# Patient Record
Sex: FEMALE | Race: ASIAN | NOT HISPANIC OR LATINO | URBAN - METROPOLITAN AREA
[De-identification: names, ages, dates, MRNs, and addresses within clinical notes are randomized per-mention and may not be internally consistent; named-entity substitution may affect disease eponyms.]

---

## 2019-06-28 ENCOUNTER — EMERGENCY (EMERGENCY)
Facility: HOSPITAL | Age: 59
LOS: 1 days | Discharge: HOME | End: 2019-06-28
Attending: EMERGENCY MEDICINE | Admitting: EMERGENCY MEDICINE
Payer: COMMERCIAL

## 2019-06-28 VITALS
RESPIRATION RATE: 16 BRPM | OXYGEN SATURATION: 96 % | TEMPERATURE: 98 F | WEIGHT: 150.8 LBS | HEART RATE: 98 BPM | DIASTOLIC BLOOD PRESSURE: 99 MMHG | HEIGHT: 59 IN | SYSTOLIC BLOOD PRESSURE: 155 MMHG

## 2019-06-28 DIAGNOSIS — S20.319A ABRASION OF UNSPECIFIED FRONT WALL OF THORAX, INITIAL ENCOUNTER: ICD-10-CM
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
## 2019-06-28 DIAGNOSIS — S40.812A ABRASION OF LEFT UPPER ARM, INITIAL ENCOUNTER: ICD-10-CM
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
## 2019-06-28 DIAGNOSIS — Z98.890 OTHER SPECIFIED POSTPROCEDURAL STATES: Chronic | ICD-10-CM

## 2019-06-28 PROCEDURE — 99283 EMERGENCY DEPT VISIT LOW MDM: CPT
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
## 2019-06-28 NOTE — ED PROVIDER NOTE - NSFOLLOWUPINSTRUCTIONS_ED_ALL_ED_FT
Please follow up with your primary physician in 1-2 days for re evaluation.  Please return to ER immediately should your symptoms worsen or if you have any concern prior to this recommended follow up.     :  [facility]             ABRASION - AfterCare(R) Instructions(ER/ED)     Abrasion    WHAT YOU NEED TO KNOW:    An abrasion is a scrape on your skin. It happens when your skin rubs against a rough surface. Some examples of an abrasion include rug burn, a skinned elbow, or road rash. Abrasions can be many shapes and sizes. The wound may hurt, bleed, bruise, or swell.     DISCHARGE INSTRUCTIONS:    Return to the emergency department if:     The bleeding does not stop after 10 minutes of firm pressure.      You cannot rinse one or more foreign objects out of your wound.      You have red streaks on your skin coming from your wound.    Contact your healthcare provider if:     You have a fever or chills.       Your abrasion is red, warm, swollen, or draining pus.      You have questions or concerns about your condition or care.    Care for your abrasion:     Wash your hands and dry them with a clean towel.      Press a clean cloth against your wound to stop any bleeding.      Rinse your wound with a lot of clean water. Do not use harsh soap, alcohol, or iodine solutions.      Use a clean, wet cloth to remove any objects, such as small pieces of rocks or dirt.      Rub antibiotic ointment on your wound. This may help prevent infection and help your wound heal.      Cover the wound with a non-stick bandage. Change the bandage daily, and if gets wet or dirty.     Follow up with your healthcare provider as directed: Write down your questions so you remember to ask them during your visits.        © Copyright [employer] 2019 All illustrations and images included in CareNotes are the copyrighted property of [employer]. or [employer].      back to top                      © Copyright [employer] 2019
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
## 2019-06-28 NOTE — ED PROVIDER NOTE - SKIN, MLM
+ superficial scratches to anterior chest wall and left proximal UE.  Skin is otherwise normal color for race, warm, dry and intact. No evidence of rash.

## 2019-06-28 NOTE — ED ADULT NURSE NOTE - CHPI ED NUR SYMPTOMS NEG
no blurred vision/no weakness/no chest pain/no chest wall tenderness/no change in level of consciousness/no back pain/no loss of consciousness/no seizure/no vomiting

## 2019-06-28 NOTE — ED ADULT NURSE NOTE - NSIMPLEMENTINTERV_GEN_ALL_ED
Implemented All Universal Safety Interventions:  [place] to call system. Call bell, personal items and telephone within reach. Instruct patient to call for assistance. Room bathroom lighting operational. Non-slip footwear when patient is off stretcher. Physically safe environment: no spills, clutter or unnecessary equipment. Stretcher in lowest position, wheels locked, appropriate side rails in place.
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
## 2019-12-20 ENCOUNTER — EMERGENCY (EMERGENCY)
Facility: HOSPITAL | Age: 59
LOS: 1 days | Discharge: HOME | End: 2019-12-20
Admitting: EMERGENCY MEDICINE
Payer: COMMERCIAL

## 2019-12-20 VITALS
TEMPERATURE: 98 F | HEIGHT: 59 IN | OXYGEN SATURATION: 98 % | RESPIRATION RATE: 18 BRPM | HEART RATE: 94 BPM | SYSTOLIC BLOOD PRESSURE: 130 MMHG | DIASTOLIC BLOOD PRESSURE: 84 MMHG | WEIGHT: 147.93 LBS

## 2019-12-20 DIAGNOSIS — Z98.890 OTHER SPECIFIED POSTPROCEDURAL STATES: Chronic | ICD-10-CM

## 2019-12-20 PROCEDURE — 71046 X-RAY EXAM CHEST 2 VIEWS: CPT

## 2019-12-20 PROCEDURE — 99283 EMERGENCY DEPT VISIT LOW MDM: CPT | Mod: 25

## 2019-12-20 PROCEDURE — 99284 EMERGENCY DEPT VISIT MOD MDM: CPT

## 2019-12-20 PROCEDURE — 94640 AIRWAY INHALATION TREATMENT: CPT

## 2019-12-20 PROCEDURE — 71046 X-RAY EXAM CHEST 2 VIEWS: CPT | Mod: 26

## 2019-12-20 RX ORDER — IPRATROPIUM/ALBUTEROL SULFATE 18-103MCG
3 AEROSOL WITH ADAPTER (GRAM) INHALATION ONCE
Refills: 0 | Status: COMPLETED | OUTPATIENT
Start: 2019-12-20 | End: 2019-12-20

## 2019-12-20 RX ADMIN — Medication 3 MILLILITER(S): at 14:50

## 2019-12-20 RX ADMIN — Medication 60 MILLIGRAM(S): at 14:50
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
## 2019-12-20 NOTE — ED ADULT NURSE NOTE - NSIMPLEMENTINTERV_GEN_ALL_ED
Implemented All Universal Safety Interventions:  [place] to call system. Call bell, personal items and telephone within reach. Instruct patient to call for assistance. Room bathroom lighting operational. Non-slip footwear when patient is off stretcher. Physically safe environment: no spills, clutter or unnecessary equipment. Stretcher in lowest position, wheels locked, appropriate side rails in place.

## 2019-12-20 NOTE — ED PROVIDER NOTE - CLINICAL SUMMARY MEDICAL DECISION MAKING FREE TEXT BOX
60 y/o f presents with cough x 1 week, sob and wheezing; afebrile, vss, diffuse wheezing on exam.  Pt given neb and prednisone with resolution of wheezing, no hx asthma, likely reactive airway 2/2 viral URI, cxr negative.  Will d/c, rx prednisone, continue albuterol inhaler, f/u pmd

## 2019-12-20 NOTE — ED ADULT NURSE NOTE - OBJECTIVE STATEMENT
pt received sitting in chair, A&O x 3. pt recently dx with URI, taking Z-pack. pt arrived to ED with c/c productive cough, referred to ED by PCP who told pt to go to ED d/t SOB. pt denies SOB at rest, n/v/d, fever. NAD noted, will continue to monitor.

## 2019-12-20 NOTE — ED PROVIDER NOTE - PATIENT PORTAL LINK FT
You can access the FollowMyHealth Patient Portal offered by [facility] by registering at the following website: [email or web address]. By joining [facility]’s FollowMyHealth portal, you will also be able to view your health information using other applications (apps) compatible with our system.

## 2019-12-25 DIAGNOSIS — R05 COUGH: ICD-10-CM

## 2019-12-25 DIAGNOSIS — J06.9 ACUTE UPPER RESPIRATORY INFECTION, UNSPECIFIED: ICD-10-CM

## 2020-05-08 ENCOUNTER — APPOINTMENT (OUTPATIENT)
Dept: DISASTER EMERGENCY | Facility: HOSPITAL | Age: 60
End: 2020-05-08

## 2020-05-08 ENCOUNTER — MESSAGE (OUTPATIENT)
Age: 60
End: 2020-05-08

## 2020-05-08 LAB
SARS-COV-2 IGG SERPL IA-ACNC: <0.1 INDEX
SARS-COV-2 IGG SERPL QL IA: NEGATIVE

## 2020-11-12 ENCOUNTER — NON-APPOINTMENT (OUTPATIENT)
Age: 60
End: 2020-11-12

## 2020-11-12 ENCOUNTER — APPOINTMENT (OUTPATIENT)
Dept: HEART AND VASCULAR | Facility: CLINIC | Age: 60
End: 2020-11-12
Payer: COMMERCIAL

## 2020-11-12 VITALS
SYSTOLIC BLOOD PRESSURE: 129 MMHG | WEIGHT: 149 LBS | TEMPERATURE: 98.5 F | HEIGHT: 59 IN | OXYGEN SATURATION: 96 % | BODY MASS INDEX: 30.04 KG/M2 | DIASTOLIC BLOOD PRESSURE: 84 MMHG | HEART RATE: 104 BPM

## 2020-11-12 PROCEDURE — 99204 OFFICE O/P NEW MOD 45 MIN: CPT

## 2020-11-12 PROCEDURE — 99072 ADDL SUPL MATRL&STAF TM PHE: CPT

## 2020-11-12 NOTE — ASSESSMENT
[FreeTextEntry1] : 61 yo F with PMH asthma here for first evaluation\par \par CHEST PAIN/FLORES\par -echocardiogram to r/o any WMA\par -in the setting of exertional symptoms will obtain exercise stress test to quantify symptoms and risk stratify for CAD\par \par HTN\par -well controlled today \par -echo as aboe\par --cont to monitor\par -healthy diet and exercise \par \par HLD\par -lipid panel reviewed\par -recc healthy diet and exercise \par -repeat lipid panel in 3 months

## 2020-11-12 NOTE — HISTORY OF PRESENT ILLNESS
[FreeTextEntry1] : 61 yo F with PMH asthma here for first evaluation\par The patient reports a sensation of "pins and needles" when running to take the bus. \par Started a month ago, relieved by rest. 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

## 2020-11-12 NOTE — PHYSICAL EXAM
[General Appearance - Well Developed] : well developed [Normal Appearance] : normal appearance [Well Groomed] : well groomed [General Appearance - Well Nourished] : well nourished [No Deformities] : no deformities [General Appearance - In No Acute Distress] : no acute distress [Normal Oral Mucosa] : normal oral mucosa [No Oral Pallor] : no oral pallor [No Oral Cyanosis] : no oral cyanosis [Normal Jugular Venous A Waves Present] : normal jugular venous A waves present [Normal Jugular Venous V Waves Present] : normal jugular venous V waves present [No Jugular Venous Marshall A Waves] : no jugular venous marshall A waves [Heart Rate And Rhythm] : heart rate and rhythm were normal [Heart Sounds] : normal S1 and S2 [Murmurs] : no murmurs present [Respiration, Rhythm And Depth] : normal respiratory rhythm and effort [Exaggerated Use Of Accessory Muscles For Inspiration] : no accessory muscle use [Auscultation Breath Sounds / Voice Sounds] : lungs were clear to auscultation bilaterally [Abdomen Soft] : soft [Abdomen Tenderness] : non-tender [Abdomen Mass (___ Cm)] : no abdominal mass palpated [Abnormal Walk] : normal gait [Gait - Sufficient For Exercise Testing] : the gait was sufficient for exercise testing [Nail Clubbing] : no clubbing of the fingernails [Cyanosis, Localized] : no localized cyanosis [Petechial Hemorrhages (___cm)] : no petechial hemorrhages [] : no ischemic changes

## 2020-11-20 ENCOUNTER — APPOINTMENT (OUTPATIENT)
Dept: HEART AND VASCULAR | Facility: CLINIC | Age: 60
End: 2020-11-20

## 2020-12-10 ENCOUNTER — APPOINTMENT (OUTPATIENT)
Dept: HEART AND VASCULAR | Facility: CLINIC | Age: 60
End: 2020-12-10

## 2023-07-20 ENCOUNTER — APPOINTMENT (OUTPATIENT)
Dept: CV DIAGNOSITCS | Facility: HOSPITAL | Age: 63
End: 2023-07-20

## 2023-07-20 ENCOUNTER — APPOINTMENT (OUTPATIENT)
Dept: CV DIAGNOSTICS | Facility: HOSPITAL | Age: 63
End: 2023-07-20
Payer: COMMERCIAL

## 2023-07-20 ENCOUNTER — OUTPATIENT (OUTPATIENT)
Dept: OUTPATIENT SERVICES | Facility: HOSPITAL | Age: 63
LOS: 1 days | End: 2023-07-20
Payer: COMMERCIAL

## 2023-07-20 DIAGNOSIS — Z01.810 ENCOUNTER FOR PREPROCEDURAL CARDIOVASCULAR EXAMINATION: ICD-10-CM

## 2023-07-20 DIAGNOSIS — Z98.890 OTHER SPECIFIED POSTPROCEDURAL STATES: Chronic | ICD-10-CM

## 2023-07-20 PROCEDURE — 93018 CV STRESS TEST I&R ONLY: CPT

## 2023-07-20 PROCEDURE — 93306 TTE W/DOPPLER COMPLETE: CPT

## 2023-07-20 PROCEDURE — 93016 CV STRESS TEST SUPVJ ONLY: CPT

## 2023-07-20 PROCEDURE — 93306 TTE W/DOPPLER COMPLETE: CPT | Mod: 26

## 2023-07-20 PROCEDURE — 93017 CV STRESS TEST TRACING ONLY: CPT

## 2023-07-21 DIAGNOSIS — Z01.810 ENCOUNTER FOR PREPROCEDURAL CARDIOVASCULAR EXAMINATION: ICD-10-CM

## 2023-08-07 ENCOUNTER — TRANSCRIPTION ENCOUNTER (OUTPATIENT)
Age: 63
End: 2023-08-07

## 2023-08-08 ENCOUNTER — TRANSCRIPTION ENCOUNTER (OUTPATIENT)
Age: 63
End: 2023-08-08

## 2023-08-10 ENCOUNTER — TRANSCRIPTION ENCOUNTER (OUTPATIENT)
Age: 63
End: 2023-08-10

## 2023-08-21 ENCOUNTER — TRANSCRIPTION ENCOUNTER (OUTPATIENT)
Age: 63
End: 2023-08-21

## 2023-08-28 ENCOUNTER — TRANSCRIPTION ENCOUNTER (OUTPATIENT)
Age: 63
End: 2023-08-28

## 2023-09-06 ENCOUNTER — TRANSCRIPTION ENCOUNTER (OUTPATIENT)
Age: 63
End: 2023-09-06

## 2024-01-30 ENCOUNTER — NON-APPOINTMENT (OUTPATIENT)
Age: 64
End: 2024-01-30

## 2024-01-30 ENCOUNTER — APPOINTMENT (OUTPATIENT)
Dept: HEART AND VASCULAR | Facility: CLINIC | Age: 64
End: 2024-01-30
Payer: COMMERCIAL

## 2024-01-30 VITALS
BODY MASS INDEX: 28.43 KG/M2 | HEIGHT: 59 IN | HEART RATE: 88 BPM | OXYGEN SATURATION: 98 % | SYSTOLIC BLOOD PRESSURE: 143 MMHG | WEIGHT: 141 LBS | DIASTOLIC BLOOD PRESSURE: 87 MMHG

## 2024-01-30 DIAGNOSIS — R06.09 OTHER FORMS OF DYSPNEA: ICD-10-CM

## 2024-01-30 DIAGNOSIS — E66.3 OVERWEIGHT: ICD-10-CM

## 2024-01-30 DIAGNOSIS — Z85.3 PERSONAL HISTORY OF MALIGNANT NEOPLASM OF BREAST: ICD-10-CM

## 2024-01-30 DIAGNOSIS — J45.909 UNSPECIFIED ASTHMA, UNCOMPLICATED: ICD-10-CM

## 2024-01-30 DIAGNOSIS — Z82.49 FAMILY HISTORY OF ISCHEMIC HEART DISEASE AND OTHER DISEASES OF THE CIRCULATORY SYSTEM: ICD-10-CM

## 2024-01-30 DIAGNOSIS — Z87.09 PERSONAL HISTORY OF OTHER DISEASES OF THE RESPIRATORY SYSTEM: ICD-10-CM

## 2024-01-30 PROCEDURE — 99205 OFFICE O/P NEW HI 60 MIN: CPT

## 2024-01-30 PROCEDURE — 93000 ELECTROCARDIOGRAM COMPLETE: CPT

## 2024-01-31 ENCOUNTER — TRANSCRIPTION ENCOUNTER (OUTPATIENT)
Age: 64
End: 2024-01-31

## 2024-01-31 LAB
ALBUMIN SERPL ELPH-MCNC: 4.3 G/DL
ALP BLD-CCNC: 82 U/L
ALT SERPL-CCNC: 22 U/L
ANION GAP SERPL CALC-SCNC: 12 MMOL/L
AST SERPL-CCNC: 21 U/L
BILIRUB SERPL-MCNC: 0.3 MG/DL
BUN SERPL-MCNC: 10 MG/DL
CALCIUM SERPL-MCNC: 9.2 MG/DL
CHLORIDE SERPL-SCNC: 107 MMOL/L
CHOLEST SERPL-MCNC: 193 MG/DL
CO2 SERPL-SCNC: 23 MMOL/L
CREAT SERPL-MCNC: 0.67 MG/DL
EGFR: 99 ML/MIN/1.73M2
ESTIMATED AVERAGE GLUCOSE: 128 MG/DL
GLUCOSE SERPL-MCNC: 87 MG/DL
HBA1C MFR BLD HPLC: 6.1 %
HCT VFR BLD CALC: 43.3 %
HDLC SERPL-MCNC: 60 MG/DL
HGB BLD-MCNC: 13.4 G/DL
LDLC SERPL CALC-MCNC: 103 MG/DL
MCHC RBC-ENTMCNC: 30.1 PG
MCHC RBC-ENTMCNC: 30.9 GM/DL
MCV RBC AUTO: 97.3 FL
NONHDLC SERPL-MCNC: 134 MG/DL
PLATELET # BLD AUTO: 233 K/UL
POTASSIUM SERPL-SCNC: 4.1 MMOL/L
PROT SERPL-MCNC: 6.9 G/DL
RBC # BLD: 4.45 M/UL
RBC # FLD: 12.4 %
SODIUM SERPL-SCNC: 142 MMOL/L
TRIGL SERPL-MCNC: 178 MG/DL
WBC # FLD AUTO: 5.67 K/UL

## 2024-02-08 ENCOUNTER — NON-APPOINTMENT (OUTPATIENT)
Age: 64
End: 2024-02-08

## 2024-02-08 ENCOUNTER — APPOINTMENT (OUTPATIENT)
Dept: PLASTIC SURGERY | Facility: CLINIC | Age: 64
End: 2024-02-08
Payer: COMMERCIAL

## 2024-02-08 VITALS — HEIGHT: 59 IN | BODY MASS INDEX: 28.43 KG/M2 | WEIGHT: 141 LBS

## 2024-02-08 DIAGNOSIS — Z78.9 OTHER SPECIFIED HEALTH STATUS: ICD-10-CM

## 2024-02-08 PROCEDURE — 99203 OFFICE O/P NEW LOW 30 MIN: CPT

## 2024-02-08 NOTE — PHYSICAL EXAM
[de-identified] : mild ptosis healed circumferential scarring from prior flaps L slightly larger than R

## 2024-02-08 NOTE — HISTORY OF PRESENT ILLNESS
[FreeTextEntry1] : 64F PMH chest pain on exertion, post nasal drip, gout, here currently being worked up, here with , [facility] August 2023 BL SSM and BL ABDIRAHMAN after L Br Ca. In November 2023, symmetrizing fat grafting BL breast. initially had L Br lumpectomy 2006w/ radiation now here for nipple reconstruction, came here because she is [employer] employee, and [employer] health insurance no longer covers her prior plastic surgeon.   non-smoker due for echo tomorrow w/ Dr. 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

## 2024-02-09 ENCOUNTER — APPOINTMENT (OUTPATIENT)
Dept: CT IMAGING | Facility: HOSPITAL | Age: 64
End: 2024-02-09

## 2024-02-09 ENCOUNTER — RESULT REVIEW (OUTPATIENT)
Age: 64
End: 2024-02-09

## 2024-02-09 ENCOUNTER — OUTPATIENT (OUTPATIENT)
Dept: OUTPATIENT SERVICES | Facility: HOSPITAL | Age: 64
LOS: 1 days | End: 2024-02-09
Payer: COMMERCIAL

## 2024-02-09 DIAGNOSIS — Z98.890 OTHER SPECIFIED POSTPROCEDURAL STATES: Chronic | ICD-10-CM

## 2024-02-09 PROCEDURE — 75574 CT ANGIO HRT W/3D IMAGE: CPT

## 2024-02-09 PROCEDURE — 93306 TTE W/DOPPLER COMPLETE: CPT

## 2024-02-09 PROCEDURE — 93306 TTE W/DOPPLER COMPLETE: CPT | Mod: 26

## 2024-02-09 PROCEDURE — 75574 CT ANGIO HRT W/3D IMAGE: CPT | Mod: 26

## 2024-02-15 RX ORDER — ALBUTEROL SULFATE 90 UG/1
108 (90 BASE) INHALANT RESPIRATORY (INHALATION)
Qty: 1 | Refills: 5 | Status: ACTIVE | COMMUNITY
Start: 2024-02-15 | End: 1900-01-01

## 2024-02-22 ENCOUNTER — APPOINTMENT (OUTPATIENT)
Dept: INTERNAL MEDICINE | Facility: CLINIC | Age: 64
End: 2024-02-22
Payer: COMMERCIAL

## 2024-02-22 VITALS
DIASTOLIC BLOOD PRESSURE: 92 MMHG | SYSTOLIC BLOOD PRESSURE: 147 MMHG | TEMPERATURE: 97.3 F | BODY MASS INDEX: 27.9 KG/M2 | OXYGEN SATURATION: 97 % | WEIGHT: 138.38 LBS | HEART RATE: 103 BPM | HEIGHT: 59 IN

## 2024-02-22 DIAGNOSIS — Z00.00 ENCOUNTER FOR GENERAL ADULT MEDICAL EXAMINATION W/OUT ABNORMAL FINDINGS: ICD-10-CM

## 2024-02-22 DIAGNOSIS — M10.9 GOUT, UNSPECIFIED: ICD-10-CM

## 2024-02-22 PROCEDURE — 99396 PREV VISIT EST AGE 40-64: CPT

## 2024-02-22 NOTE — ASSESSMENT
[FreeTextEntry1] : Refusing flu shot patient has shingles and also received the shingles vaccine afterwards Last colonoscopy 2021, as per patient no abnormal findings, next 1 in 2028 Pap smear last year, as per patient with normal limits

## 2024-02-22 NOTE — HISTORY OF PRESENT ILLNESS
[de-identified] : This is a 64-year-old female with past medical history of breast cancer status pleural ostectomy on tamoxifen, gout, asthma and migraines.  Comes to establish care.  Today patient is referring of shortness of breath on exertion, has cardiac workup negative.  Her shortness of breath improved with albuterol.  Also complaining of 1 week of dry cough not associated with viral symptoms.

## 2024-02-22 NOTE — REVIEW OF SYSTEMS
[Shortness Of Breath] : shortness of breath [Wheezing] : wheezing [Cough] : cough [Dyspnea on Exertion] : dyspnea on exertion [Negative] : Gastrointestinal

## 2024-02-22 NOTE — PHYSICAL EXAM
[Normal Sclera/Conjunctiva] : normal sclera/conjunctiva [Normal Outer Ear/Nose] : the outer ears and nose were normal in appearance [No JVD] : no jugular venous distention [No Respiratory Distress] : no respiratory distress  [Normal] : normal rate, regular rhythm, normal S1 and S2 and no murmur heard [de-identified] : Bilateral wheezing

## 2024-02-26 ENCOUNTER — TRANSCRIPTION ENCOUNTER (OUTPATIENT)
Age: 64
End: 2024-02-26

## 2024-02-28 ENCOUNTER — TRANSCRIPTION ENCOUNTER (OUTPATIENT)
Age: 64
End: 2024-02-28

## 2024-02-28 RX ORDER — AZITHROMYCIN 500 MG/1
500 TABLET, FILM COATED ORAL
Qty: 3 | Refills: 0 | Status: COMPLETED | COMMUNITY
Start: 2024-02-28 | End: 2024-03-02

## 2024-02-28 RX ORDER — COLCHICINE 0.6 MG/1
0.6 TABLET ORAL DAILY
Qty: 60 | Refills: 0 | Status: ACTIVE | COMMUNITY
Start: 2024-02-22 | End: 1900-01-01

## 2024-02-29 NOTE — REASON FOR VISIT
[FreeTextEntry1] : L breast Ca again dx 2023, s/p b/l mastectomy and chemotherapy (no radiation), previously on anastrozole  2006 in-situ breast Ca -> radiation therapy x2 months of L breast -> tamoxifen x5 years [FreeTextEntry3] : Dr. Herbert

## 2024-02-29 NOTE — HISTORY OF PRESENT ILLNESS
[FreeTextEntry1] : 60-year-old woman, with a past medical history of breast cancer, and asthma, presenting for establishment of cardiovascular care.    L breast Ca again dx 2023, s/p b/l mastectomy and chemotherapy (no radiation), previously on anastrozole  2006 in-situ breast Ca -> radiation therapy x2 months of L breast -> tamoxifen x5 years    61 yo F with PMH asthma here for first evaluation  The patient reports a sensation of "pins and needles" when running to take the bus.  Started a month ago, relieved by rest. 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

## 2024-03-01 PROBLEM — Z85.3 HISTORY OF MALIGNANT NEOPLASM OF BREAST: Status: RESOLVED | Noted: 2024-03-01 | Resolved: 2024-03-01

## 2024-03-01 PROBLEM — Z87.09 HISTORY OF ASTHMA: Status: RESOLVED | Noted: 2024-03-01 | Resolved: 2024-03-01

## 2024-03-01 PROBLEM — Z82.49 FAMILY HISTORY OF HYPERTENSION: Status: ACTIVE | Noted: 2024-03-01

## 2024-03-01 PROBLEM — J45.909 ASTHMA: Status: ACTIVE | Noted: 2024-02-22

## 2024-03-01 PROBLEM — E66.3 OVERWEIGHT: Status: ACTIVE | Noted: 2024-03-01

## 2024-03-05 ENCOUNTER — NON-APPOINTMENT (OUTPATIENT)
Age: 64
End: 2024-03-05

## 2024-03-05 ENCOUNTER — APPOINTMENT (OUTPATIENT)
Dept: HEART AND VASCULAR | Facility: CLINIC | Age: 64
End: 2024-03-05
Payer: COMMERCIAL

## 2024-03-05 VITALS
SYSTOLIC BLOOD PRESSURE: 130 MMHG | HEIGHT: 59 IN | BODY MASS INDEX: 27.42 KG/M2 | DIASTOLIC BLOOD PRESSURE: 90 MMHG | OXYGEN SATURATION: 99 % | HEART RATE: 81 BPM | WEIGHT: 136 LBS

## 2024-03-05 DIAGNOSIS — I25.10 ATHEROSCLEROTIC HEART DISEASE OF NATIVE CORONARY ARTERY W/OUT ANGINA PECTORIS: ICD-10-CM

## 2024-03-05 DIAGNOSIS — E78.5 HYPERLIPIDEMIA, UNSPECIFIED: ICD-10-CM

## 2024-03-05 PROCEDURE — 99215 OFFICE O/P EST HI 40 MIN: CPT

## 2024-03-05 PROCEDURE — 93000 ELECTROCARDIOGRAM COMPLETE: CPT

## 2024-03-05 NOTE — HISTORY OF PRESENT ILLNESS
[FreeTextEntry1] : 64-year-old woman, with a past medical history of breast cancer, and asthma, presenting for follow-up of chest discomfort. Patient was diagnosed with left breast cancer in situ in 2006, and underwent radiation therapy of the left breast for 2 months followed by tamoxifen for 5 years. She was again diagnosed with breast cancer in 2023 and is now status post bilateral mastectomy and chemotherapy (anastrozole). In recent months, she notes chest discomfort when running to catch the bus. This chest discomfort is described as "pins-and-needles," and is relieved with rest. She also experiences chronic dyspnea on exertion but believes this to be secondary to asthma as it is associated with wheezing. Otherwise, she presently denies palpitations, dizziness/LOC, PND, orthopnea, HAs, abdominal pain, and LE swelling. Given these symptoms, she underwent a TTE which demonstrated normal biventricular systolic function, and a CCTA with a calcium score of 0 and minimal CAD (details below).

## 2024-03-05 NOTE — PHYSICAL EXAM
[No Acute Distress] : no acute distress [Normal Conjunctiva] : normal conjunctiva [Normal Venous Pressure] : normal venous pressure [No Carotid Bruit] : no carotid bruit [Normal S1, S2] : normal S1, S2 [No Murmur] : no murmur [No Rub] : no rub [No Gallop] : no gallop [Clear Lung Fields] : clear lung fields [Good Air Entry] : good air entry [Soft] : abdomen soft [No Respiratory Distress] : no respiratory distress  [Non Tender] : non-tender [No Masses/organomegaly] : no masses/organomegaly [Normal Gait] : normal gait [Normal Bowel Sounds] : normal bowel sounds [No Cyanosis] : no cyanosis [No Edema] : no edema [No Varicosities] : no varicosities [No Clubbing] : no clubbing [No Skin Lesions] : no skin lesions [No Rash] : no rash [Moves all extremities] : moves all extremities [No Focal Deficits] : no focal deficits [Alert and Oriented] : alert and oriented [Normal Speech] : normal speech [Normal memory] : normal memory

## 2024-03-05 NOTE — ASSESSMENT
[FreeTextEntry1] : 64-year-old woman, with a past medical history of breast cancer, and asthma, presenting for establishment of cardiovascular care.  Preoperative Cardiovascular Assessment: Patient is planned to have breast reconstruction procedure on April 8th. She is able to ambulate >10 blocks and >2 flights of stairs without inhibition by CP or FLORES. TTE demonstrated normal biventricular systolic function, and a CCTA with a calcium score of 0 and minimal CAD (details above). - RCRI Score 1, Class II risk: 6.0% 30-day risk of death, MI, or cardiac arrest - patient able to achieve >4 METs w/o anginal symptoms or anginal equivalents - patient is at low risk for an intermediate risk surgery  - no cardiac contraindications to the planned procedure  CP/FLORES: In recent months, she notes chest discomfort when running to catch the bus. This chest discomfort is described as "pins-and-needles," and is relieved with rest. She also experiences chronic dyspnea on exertion but believes this to be secondary to asthma as it is associated with wheezing. Otherwise, she presently denies palpitations, dizziness/LOC, PND, orthopnea, HAs, abdominal pain, and LE swelling. Given these symptoms, she underwent a TTE which demonstrated normal biventricular systolic function, and a CCTA with a calcium score of 0 and minimal CAD (details below). Therefore, discomfort not related to CAD. - patient instructed to contact me and/or seek immediate medical attention if symptoms recur or worsen  HLD: Latest lipid profile on 1/30/24 with a , , HDL 60, and . Minimal CAD noted on CCTA.  - atorvastatin 10mg PO QHS started today  Overweight: BMI 27.47 kg/m2. - lifestyle modifications (diet and exercise) - weight loss counseling - increase aerobic exercise as tolerated to aim for approximately 30 minutes of activity 5 days a week - heart healthy diet low in sodium, sugars and saturated fats and high in fruits, vegetables and whole grains  F/u s/p surgery.

## 2024-03-05 NOTE — CARDIOLOGY SUMMARY
[de-identified] : 3/5/24: NSR at 77 bpm 1/30/24: NSR at 76 bpm 11/12/20: sinus tachycardia () [de-identified] : TTE 2/9/24: 1. Normal left and right ventricular size and systolic function.  2. No significant valvular disease.  3. No evidence of pulmonary hypertension.  4. No pericardial effusion.  5. No prior echo is available for comparison.  [de-identified] : CCTA 2/9/24: 1.  The calcium score is 0 Agatston units. 2.  Minimal proximal LAD stenosis 3.  Remaining coronary segments appear normal

## 2024-03-06 ENCOUNTER — TRANSCRIPTION ENCOUNTER (OUTPATIENT)
Age: 64
End: 2024-03-06

## 2024-03-07 ENCOUNTER — TRANSCRIPTION ENCOUNTER (OUTPATIENT)
Age: 64
End: 2024-03-07

## 2024-03-15 NOTE — ED ADULT NURSE NOTE - CHPI ED NUR QUALITY2
Patient seen in clinic today but needed to go home to check her calendar. Will call to schedule. Card provided.      Patient: Charity Freeman    Procedure: EGD    Procedure instructions if any:    Reason for Hospital: NA    Physician: Fernando Medina MD    Diagnosis: gerd    Diabetic: No    Blood Thinners: No    Pharmacy(schedulers):    Instructions sent(schedulers):      
aching

## 2024-03-21 PROBLEM — Z80.3 FAMILY HISTORY OF BREAST CANCER: Status: ACTIVE | Noted: 2024-03-21

## 2024-03-22 ENCOUNTER — LABORATORY RESULT (OUTPATIENT)
Age: 64
End: 2024-03-22

## 2024-03-22 ENCOUNTER — APPOINTMENT (OUTPATIENT)
Dept: HEMATOLOGY ONCOLOGY | Facility: CLINIC | Age: 64
End: 2024-03-22
Payer: COMMERCIAL

## 2024-03-22 VITALS
RESPIRATION RATE: 18 BRPM | HEART RATE: 99 BPM | OXYGEN SATURATION: 97 % | HEIGHT: 59 IN | TEMPERATURE: 98.3 F | DIASTOLIC BLOOD PRESSURE: 82 MMHG | WEIGHT: 136 LBS | BODY MASS INDEX: 27.42 KG/M2 | SYSTOLIC BLOOD PRESSURE: 126 MMHG

## 2024-03-22 DIAGNOSIS — Z80.3 FAMILY HISTORY OF MALIGNANT NEOPLASM OF BREAST: ICD-10-CM

## 2024-03-22 PROCEDURE — 99204 OFFICE O/P NEW MOD 45 MIN: CPT

## 2024-03-22 RX ORDER — MONTELUKAST 10 MG/1
10 TABLET, FILM COATED ORAL DAILY
Qty: 30 | Refills: 3 | Status: DISCONTINUED | COMMUNITY
Start: 2024-02-22 | End: 2024-03-22

## 2024-03-22 RX ORDER — BUDESONIDE AND FORMOTEROL FUMARATE DIHYDRATE 80; 4.5 UG/1; UG/1
80-4.5 AEROSOL RESPIRATORY (INHALATION)
Qty: 1 | Refills: 1 | Status: DISCONTINUED | COMMUNITY
Start: 2024-02-22 | End: 2024-03-22

## 2024-03-22 RX ORDER — ALBUTEROL 90 MCG
90 AEROSOL (GRAM) INHALATION
Refills: 0 | Status: DISCONTINUED | COMMUNITY
End: 2024-03-22

## 2024-03-22 NOTE — PHYSICAL EXAM
[Restricted in physically strenuous activity but ambulatory and able to carry out work of a light or sedentary nature] : Status 1- Restricted in physically strenuous activity but ambulatory and able to carry out work of a light or sedentary nature, e.g., light house work, office work [de-identified] : s/p bilateral mastectomies with ABDIRAHMAN reconstruction, no palpable lesions in either axilla, supraclavicular areas nor over chest wall [Normal] : affect appropriate

## 2024-03-22 NOTE — ASSESSMENT
[FreeTextEntry1] : 60-year-old woman with a history of breast cancer is referred by Dr. Gupta to establish care. She was under the care of medical oncologist Annika Taylor, but she is no longer in network.  She had Stage 1A T1aN0 ER+/MD+/HER2- left breast cancer s/p left lumpectomy/SLNB on 4/17/2006 (Dr. Capri Ambriz @ [facility]), adjuvant radiation therapy to the left breast for 2 months followed by tamoxifen for 5 years.  She was then diagnosed with Stage 1A T2N0 ER+/MD+/HER2- left ILC in 2023 s/p 8/4/23 bilateral mastectomy/L SLNB/ABDIRAHMAN reconstruction (Flori Chaney/Salas Pagan @ [facility]), 11/2/23 bilateral breast reconstruction revision/fat graft.  She was taking anastrozole since end of August-November 2023 (discontinued due to arthralgia/bone pain) and was switched to tamoxifen on 11/30/23.  Pt notes possible LFT abnormalities while on Tamoxifen and taking it only for 3.5yrs because of that. She also expresses concern over bone density loss while on AI and states she prefers Tamoxifen in part because of that to the AI.   Last DEXA osteopenia in 6/2023, T score -1.2.   Reviewed with patient two different options including switching to Exemestane vs staying on Tamoxifen. I reviewed that AIs are slightly more effective than Tamoxifen in terms of breast cancer outcomes but that AIs such as Exemestane are associated with a decrease in bone density whereas Tamoxifen in a postmenopausal woman is not. I also reviewed that since she is tolerating Tamoxifen well it is an option to continue it; my concern with it is h/o LFT abnormalities that necessitated her to interrupt her therapy. Patient preferred to stay on Tamoxifen rather than switch to Exemestance and I therefore recommended to come back for a more frequent follow up asking to monitor her LFTs more frequently, with which she was in agreement with.   I also recommended to check the DEXA scan at a year's time in 6/2024, to take vitamin D/Ca.   Upon patient's request checked labs today including estradiol level.   RTC in 3 months for follow up.

## 2024-03-22 NOTE — HISTORY OF PRESENT ILLNESS
[Disease: _____________________] : Disease: [unfilled] [T: ___] : T[unfilled] [N: ___] : N[unfilled] [M: ___] : M[unfilled] [AJCC Stage: ____] : AJCC Stage: [unfilled] [de-identified] : 60-year-old woman with a history of breast cancer is referred by Dr. Gupta to establish care. She was under the care of medical oncologist Annika Taylor, but she is no longer in network.  She had Stage 1A T1aN0 ER+/IL+/HER2- left breast cancer s/p left lumpectomy/SLNB on 4/17/2006 (Dr. Capri Ambriz @ [facility]), adjuvant radiation therapy to the left breast for 2 months followed by tamoxifen for 5 years.  She was then diagnosed with Stage 1A T2N0 ER+/IL+/HER2- left ILC in 2023 s/p 8/4/23 bilateral mastectomy/L SLNB/ABDIRAHMAN reconstruction (Flori Chaney/Salas Pagan @ [facility]), 11/2/23 bilateral breast reconstruction revision/fat graft.  She was taking anastrozole since end of August-November 2023 (discontinued due to arthralgia/bone pain) and was switched to tamoxifen on 11/30/23.  5/19/23 b/l MG: There are postsurgical changes in the left breast at 12:00.  There is increasing density and distortion at 12:00 measuring approximately 13 mm.  Asymmetry in the anterior upper left breast.  Within the right breast no discrete mass or calcified lesions identified.  5/26/23 L MG/US: 2.1 cm x 1.8 cm x 1 cm irregular left breast 12:00 mass is highly suggestive of malignancy.  0.4 cm x 0.3 cm x 0.3 cm oval mass in the left breast at 1:00 is hichly suggestive of malignancy.  6/8/23 US Left breast 12:00 retroareolar biopsy: ILC, classic type, grade 2, measuring up to 9 mm in biopsy. %, IL 5%, Ki-67 20%, HER2 2+ equivocal FISH negative The small suspicious nodule at 1:00 was not biopsied due to increased peripheral vascularity and risk of bleeding.  6/19/23 MR breast:  1. 2.3 cm enhancing mass at left 12:00/retro extending to the base of the nipple and also causing nipple retraction, consistent with the patient's newly diagnosed malignancy at left 12:00.  There is an adjacent highly suspicious enhancing lesion at left 7:00/drew.   2. Other highly suspicious probable satellite lesions scattered throughout the left breast such as at left 1:00 as seen on recent ultrasound and left 6:00 in addition to other scattered stippled enhancing foci.  2nd look targeted left whole breast ultrasound is recommended.  US guided biopsy of the left 1:00 site is recommended in addition to 1-2 other sites to determine whether the left breast has multicentric disease.  Biopsy of additional sites could be performed with MRI guidance if nothing is seen on conventional imaging. BI-RADS 4  6/20/23 DEXA: osteopenia of the left femoral neck (T score -1.2)  8/4/23 b/l mastectomy/L SLNB: 1. RIGHT mastectomy: benign breast parenchyma, negative for carcinoma. 2. 0/2 L SLNs 3. LEFT mastectomy: invasive lobular carcinoma, Chiqui grade 2, measuring 30 mm.  Negative margins. LCIS. ER 99%, IL 40%, Ki-67 15%, HER2 2+ equivocal FISH negative  [de-identified] : Genetic testing 3/2010, 3/2016, 6/2023 negative [de-identified] : moderately differentiated invasive lobular carcinoma [de-identified] : ER %, SD 20-40%, HER2 2+ FISH negative, Ki-67 15-20%, Oncotype RS 17 [de-identified] : Feeling ok. 11/30/23 Dr. Taylor switched anastrozole to tamoxifen d/t body aches. No body aches on tamoxifen.

## 2024-03-25 ENCOUNTER — APPOINTMENT (OUTPATIENT)
Dept: INTERNAL MEDICINE | Facility: CLINIC | Age: 64
End: 2024-03-25

## 2024-03-28 ENCOUNTER — APPOINTMENT (OUTPATIENT)
Dept: PLASTIC SURGERY | Facility: CLINIC | Age: 64
End: 2024-03-28
Payer: COMMERCIAL

## 2024-03-28 ENCOUNTER — OUTPATIENT (OUTPATIENT)
Dept: OUTPATIENT SERVICES | Facility: HOSPITAL | Age: 64
LOS: 1 days | End: 2024-03-28
Payer: COMMERCIAL

## 2024-03-28 VITALS
DIASTOLIC BLOOD PRESSURE: 84 MMHG | HEART RATE: 91 BPM | TEMPERATURE: 98 F | WEIGHT: 136.03 LBS | HEIGHT: 59 IN | OXYGEN SATURATION: 98 % | RESPIRATION RATE: 18 BRPM | SYSTOLIC BLOOD PRESSURE: 126 MMHG

## 2024-03-28 DIAGNOSIS — Z98.890 OTHER SPECIFIED POSTPROCEDURAL STATES: Chronic | ICD-10-CM

## 2024-03-28 DIAGNOSIS — Z01.818 ENCOUNTER FOR OTHER PREPROCEDURAL EXAMINATION: ICD-10-CM

## 2024-03-28 DIAGNOSIS — Z85.3 PERSONAL HISTORY OF MALIGNANT NEOPLASM OF BREAST: ICD-10-CM

## 2024-03-28 PROCEDURE — 99214 OFFICE O/P EST MOD 30 MIN: CPT | Mod: 25

## 2024-03-28 PROCEDURE — 99212 OFFICE O/P EST SF 10 MIN: CPT

## 2024-03-28 NOTE — PHYSICAL EXAM
[de-identified] : mild ptosis healed circumferential scarring from prior flaps L slightly larger than R

## 2024-03-28 NOTE — H&P PST ADULT - HISTORY OF PRESENT ILLNESS
pt with hx breast ca and breast reconstruction   now for planned procedure     PATIENT CURRENTLY DENIES CHEST PAIN  SHORTNESS OF BREATH  PALPITATIONS,  DYSURIA, OR UPPER RESPIRATORY INFECTION IN PAST 2 WEEKS  denies travel outside the [place] in the past 30 days    Anesthesia Alert  NO--Difficult Airway  NO--History of neck surgery or radiation  NO--Limited ROM of neck  NO--History of Malignant hyperthermia  NO--No personal or family history of Pseudocholinesterase deficiency.  Prior Anesthesia Complication n/v, delayed awakening   + Latex Allergy  NO--Loose teeth  NO--History of Rheumatoid Arthritis  NO--Bleeding risk  NO--SAVANNAH  left arm precautions     PT DENIES ANY RASHES, ABRASION, OR OPEN WOUNDS OR CUTS    AS PER THE PT, THIS IS HIS/HER COMPLETE MEDICAL AND SURGICAL HX, INCLUDING MEDICATIONS PRESCRIBED AND OVER THE COUNTER    Patient verbalized understanding of instructions and was given the opportunity to ask questions and have them answered.    pt denies any suicidal ideation or thoughts, pt states not a threat to self or others    History of malignant neoplasm of breast    Encounter for other preprocedural examination    No pertinent past medical history    No significant past surgical history    H/O rotator cuff surgery    [id number]; [id number]    [email or web address]    Revised Cardiac Risk Index for Pre-Operative Risk from [employer]  on 3/28/2024  ** All calculations should be rechecked by clinician prior to use **    RESULT SUMMARY:  0 points  Class I Risk    3.9 %  30-day risk of death, MI, or cardiac arrest    From Duceppe 2017. These numbers are higher than those from the original study (Felipe 1999). See Evidence for details.      INPUTS:  Elevated-risk surgery —> 0 = No  History of ischemic heart disease —> 0 = No  History of congestive heart failure —> 0 = No  History of cerebrovascular disease —> 0 = No  Pre-operative treatment with insulin —> 0 = No  Pre-operative creatinine >2 mg/dL / 176.8 µmol/L —> 0 = No    Duke Activity Status Index (DASI) from [employer]    RESULT SUMMARY:  58.2 points  The higher the score (maximum 58.2), the higher the functional status.    9.89 METs        INPUTS:  Take care of self —> 2.75 = Yes  Walk indoors —> 1.75 = Yes  Walk 1&ndash;2 blocks on level ground —> 2.75 = Yes  Climb a flight of stairs or walk up a hill —> 5.5 = Yes  Run a short distance —> 8 = Yes  Do light work around the house —> 2.7 = Yes  Do moderate work around the house —> 3.5 = Yes  Do heavy work around the house —> 8 = Yes  Do yardwork —> 4.5 = Yes  Have sexual relations —> 5.25 = Yes  Participate in moderate recreational activities —> 6 = Yes  Participate in strenuous sports —> 7.5 = Yes

## 2024-03-28 NOTE — H&P PST ADULT - SOURCE OF INFORMATION, PROFILE
Cardiac Surgery Progress Note    Subjective  Pt examined and seen resting in bed sating well on RA. No acute events O/N.     History Of Present Illness  Patient is a 64 year old female who presented to the hospital as a transfer from outside hospital facility for second opinion in the setting of her advanced heart failure. Patient denies having any history of diabetes mellitus but has had heart failure for many years. Over the past few months she started having worsening volume overload and she was admitted to [facility] where she was evaluated for advanced therapy and deemed not a candidate for transplant or LVAD. She was transferred to [facility]. CV surgery consulted for evaluation. Pt is now s/p TVR and LVAD insertion with temporary RVAD on 8/4/23.    Past Medical History   has a past medical history of Congestive heart failure (CMD), Dilated cardiomyopathy (CMD), Fracture of atrial electrode lead of implantable cardioverter-defibrillator (ICD), Paroxysmal A-fib (CMD), Ventricular tachycardia (CMD), and VT (ventricular tachycardia) (CMD).  Surgical History   has a past surgical history that includes CARDIAC DEVICE CHECK - IN CLINIC; EP Ablation; Lead Extraction (2021); EP Study/Possible VT Ablation (01/26/2023); and Left ventricular assist device (Left, 08/04/2023).     Social History   reports that she has quit smoking. Her smoking use included cigarettes. She has been exposed to tobacco smoke. She does not have any smokeless tobacco history on file.  Family History  family history includes Coronary Artery Disease in an other family member.    Review of Systems  Constitutional: Denies fever, chills, sweats, or fatigue  ENT/Mouth: Negative for sore throat, Rhinorrhea, or hearing changes  Eyes: Negative for eye pain, redness, or any vision changes  Cardiovascular: Denies chest pain, edema, SOB, or palpitations  Respiratory: Denies cough, any sputum, or wheezing  GI: Denies n/v/d, constipation or  melena  Genitourinary: Negative for dysuria or hematuria  Musculoskeletal: Denies pain, joint swelling, stiffness, or back pain  Skin: Negative for skin lesions or pruritis  Neuro: Negative for weakness, numbness, or tingling  Psych: Denies feeling depressed or distressed      Physical Exam  Physical Exam  Constitutional:       General: She is not in acute distress.  HENT:      Head: Normocephalic.      Neck: Normal range of motion.   Eyes:      Pupils: Pupils are equal, round, and reactive to light.   Cardiovascular:      Rate and Rhythm: Normal rate.      Comments: Ventricular paced  Pulmonary:      Effort: Pulmonary effort is normal.   Abdominal:      Palpations: Abdomen is soft.   Musculoskeletal:         General: Normal range of motion.   Skin:     General: Skin is warm and dry.   Neurological:      Mental Status: She is alert and oriented to person, place, and time.   Psychiatric:         Behavior: Behavior normal.       VAD Interrogation  VAD Type: HM 3 LVAD  Implant Date: 8/4/23  Flows: 4.5 L/min  Pump Speed: 5,600 RPMs  PI: 3  Power: 4.2 W     RVAD Interrogation    ECMO information: Temporary RVAD    ECMO indication: Cardiogenic shock    Cannulation: #25mm left femoral percutaneous cannula, with 10mm graft hemashield graft to the pulmonary artery    ECMO Results   Internal Changes:               ECMO Pump Type Centrifugal Pump              ECMO Sweep Flow 2.5 L/min             ECMO Sweep Flow FiO2 1             ECMO Pump Flow 3.5 L/min             ECMO Pump Flow - RPM @ 2,900 RPMs             ECMO Preoxygenator Pressure  91 mm Hg    I have independently reviewed all vitals, labs, and imaging documented below.    Last Recorded Vitals  Temp:  [98.2 °F (36.8 °C)-99.3 °F (37.4 °C)] 98.2 °F (36.8 °C)  Heart Rate:  [65-87] 75  Resp:  [0-29] 14  BP: (87)/(67) 87/67  Arterial Line BP: (72-94)/(52-76) 87/73    CVP:  [5 mmHg-9 mmHg] 9 mmHg     Labs  Recent Labs   Lab 09/01/23  0331 08/31/23  2149 08/31/23  9087  08/31/23 0317 08/30/23  1042 08/30/23 0318   SODIUM 144  --   --  145  --  145   POTASSIUM 4.4 3.7 3.5 3.9   < > 3.8   CHLORIDE 107  --   --  109  --  110   CO2 32  --   --  31  --  29   BUN 49*  --   --  48*  --  56*   CREATININE 0.96*  --   --  0.87  --  1.04*   GLUCOSE 128*  --   --  135*  --  140*   CALCIUM 8.5  --   --  8.6  --  8.7    < > = values in this interval not displayed.     Recent Labs   Lab 09/01/23  0331 08/31/23 0317 08/30/23 0318   SODIUM 144 145 145   CHLORIDE 107 109 110   CO2 32 31 29   BUN 49* 48* 56*   CREATININE 0.96* 0.87 1.04*   CALCIUM 8.5 8.6 8.7   ALBUMIN 2.7* 2.7* 2.9*   BILIRUBIN 1.1* 1.1* 1.2*   ALKPT 123* 129* 127*   GPT 64* 70* 64*   AST 59* 74* 74*   GLUCOSE 128* 135* 140*     Recent Labs   Lab 09/01/23  0331   WBC 11.3*   RBC 2.62*   HGB 7.7*   HCT 24.9*               Assessment    Patient is a 64 year old female with CHF, dilated cardiomyopathy, fracture of AICD, paroxysmal A-fib, ventricular tachycardia who presented to [facility] for LVAD workup. Pt is now s/p TVR and LVAD insertion with temporary RVAD on 8/4/23.      Plan    - Coumadin 3mg.  - Decrease Lactulose.   - Transfuse 1 unit of blood.   - Continue Bumex 2 mg gtt - monitor BUN/Creatinine  - Decrease VV ECMO speeds to 2,900 RPMs.  - Continue LVAD at current speeds of 5,600 RPMs.    Charting performed by johana Pollock for Dr. Negro.    All medical record entries made by the scribbrooke were at my direction. I have reviewed the chart and agree that the record accurately reflects my personal performance of the history, physical exam, hospital course, and assessment and plan.        patient

## 2024-03-28 NOTE — H&P PST ADULT - NSICDXPASTMEDICALHX_GEN_ALL_CORE_FT
PAST MEDICAL HISTORY:  Asthma     Breast cancer     Seasonal allergies      Cephalexin Counseling: I counseled the patient regarding use of cephalexin as an antibiotic for prophylactic and/or therapeutic purposes. Cephalexin (commonly prescribed under brand name Keflex) is a cephalosporin antibiotic which is active against numerous classes of bacteria, including most skin bacteria. Side effects may include nausea, diarrhea, gastrointestinal upset, rash, hives, yeast infections, and in rare cases, hepatitis, kidney disease, seizures, fever, confusion, neurologic symptoms, and others. Patients with severe allergies to penicillin medications are cautioned that there is about a 10% incidence of cross-reactivity with cephalosporins. When possible, patients with penicillin allergies should use alternatives to cephalosporins for antibiotic therapy.

## 2024-03-28 NOTE — H&P PST ADULT - NSICDXPASTSURGICALHX_GEN_ALL_CORE_FT
PAST SURGICAL HISTORY:  H/O left knee surgery     H/O rotator cuff surgery     History of breast surgery

## 2024-03-28 NOTE — HISTORY OF PRESENT ILLNESS
[FreeTextEntry1] : 64F PMH chest pain on exertion, post nasal drip, gout, here currently being worked up, here with , [facility] August 2023 BL SSM and BL ABDIRAHMAN after L Br Ca. In November 2023, symmetrizing fat grafting BL breast. initially had L Br lumpectomy 2006w/ radiation now here for nipple reconstruction, came here because she is [employer] employee, and [employer] health insurance no longer covers her prior plastic surgeon.   non-smoker due for echo tomorrow w/ Dr. 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

## 2024-03-28 NOTE — ASSESSMENT
[FreeTextEntry1] : 64F PMH chest pain on exertion, post nasal drip, gout, here currently being worked up, here with , [facility] August 2023 BL SSM and BL ABDIRAHMAN after L Br Ca. In november 2023, symmetrizing fat grafting BL breast. Initially had L Br lumpectomy 2006w/ radiation now here for nipple reconstruction, came here because she is [employer] employee, and [employer] health insurance no longer will cover costs at prior PRS surgeon  plan - pt is good candidate for NAC reconstruction BL - discussed procedure in detail  - counseled pt on better option to wait until healing from prior sx and edema settles to avoid post NAC reconstruction asymmetry  - f/u 1-2 months for further planning - echo pending per pt  Regarding the procedure, we discussed scarring, poor wound healing, bleeding, infection, need for additional surgery, and dissatisfaction with the outcome.  Also discussed possibility of keloid and/or hypertrophic scar formation as well as recurrence.  All questions were answered and risks understood.  as above seenw Dr Rdz and  pt works in [place] at [employer]   needs NAC creation  s/p B/L mastectomy and ABDIRAHMAN flap recon in [place] ([facility]) in Aug 2023 prior h/o RT to left breast  had revision w breast liposuction and breast fat graft in Aug 2023  Photos taken with patient permission.  plan NAC in April f/u March 2023  seen w   Photos taken with patient permission.   3/28/2024 as above seen w  preop for B/L NAC creastion and tatooing left brast slightly larger than right--offered mild revision w liposuction I estimate the diff to be 5-10% pt will decide if wishes to have revisionw lipo and let me know  all ?s asnwered

## 2024-03-28 NOTE — H&P PST ADULT - REASON FOR ADMISSION
Patient is a  64 year old  female presenting to [facility]ST in preparation for CREATION AND TATTOOING OF BILATERAL NIPPLE AREOLAR COMPLEX  on   4/8/24 under general anesthesia by Dr. medrano

## 2024-03-29 DIAGNOSIS — Z01.818 ENCOUNTER FOR OTHER PREPROCEDURAL EXAMINATION: ICD-10-CM

## 2024-03-29 DIAGNOSIS — Z85.3 PERSONAL HISTORY OF MALIGNANT NEOPLASM OF BREAST: ICD-10-CM

## 2024-04-01 RX ORDER — ERGOCALCIFEROL 1.25 MG/1
1.25 MG CAPSULE, LIQUID FILLED ORAL
Qty: 8 | Refills: 0 | Status: ACTIVE | COMMUNITY
Start: 2024-04-01 | End: 1900-01-01

## 2024-04-05 NOTE — ASU PATIENT PROFILE, ADULT - FALL HARM RISK - CONCLUSION
Please call and Bactrim patient is to take 1 tablet twice daily for the next 7 days Universal Safety Interventions

## 2024-04-05 NOTE — ASU PATIENT PROFILE, ADULT - FALL HARM RISK - UNIVERSAL INTERVENTIONS
Bed in lowest position, wheels locked, appropriate side rails in place/Call bell, personal items and telephone in reach/Instruct patient to call for assistance before getting out of bed or chair/Non-slip footwear when patient is out of bed/[place] to call system/Physically safe environment - no spills, clutter or unnecessary equipment/Purposeful Proactive Rounding/Room/bathroom lighting operational, light cord in reach

## 2024-04-08 ENCOUNTER — APPOINTMENT (OUTPATIENT)
Dept: PLASTIC SURGERY | Facility: AMBULATORY SURGERY CENTER | Age: 64
End: 2024-04-08
Payer: COMMERCIAL

## 2024-04-08 ENCOUNTER — OUTPATIENT (OUTPATIENT)
Dept: OUTPATIENT SERVICES | Facility: HOSPITAL | Age: 64
LOS: 1 days | Discharge: HOME | End: 2024-04-08
Payer: COMMERCIAL

## 2024-04-08 ENCOUNTER — TRANSCRIPTION ENCOUNTER (OUTPATIENT)
Age: 64
End: 2024-04-08

## 2024-04-08 VITALS
OXYGEN SATURATION: 98 % | TEMPERATURE: 98 F | HEIGHT: 59 IN | SYSTOLIC BLOOD PRESSURE: 126 MMHG | RESPIRATION RATE: 18 BRPM | WEIGHT: 136.03 LBS | DIASTOLIC BLOOD PRESSURE: 84 MMHG | HEART RATE: 91 BPM

## 2024-04-08 VITALS
HEART RATE: 85 BPM | SYSTOLIC BLOOD PRESSURE: 128 MMHG | RESPIRATION RATE: 18 BRPM | DIASTOLIC BLOOD PRESSURE: 76 MMHG | OXYGEN SATURATION: 100 %

## 2024-04-08 DIAGNOSIS — Z91.040 LATEX ALLERGY STATUS: ICD-10-CM

## 2024-04-08 DIAGNOSIS — Z98.890 OTHER SPECIFIED POSTPROCEDURAL STATES: Chronic | ICD-10-CM

## 2024-04-08 DIAGNOSIS — N65.0 DEFORMITY OF RECONSTRUCTED BREAST: ICD-10-CM

## 2024-04-08 DIAGNOSIS — Z85.3 PERSONAL HISTORY OF MALIGNANT NEOPLASM OF BREAST: ICD-10-CM

## 2024-04-08 DIAGNOSIS — J45.909 UNSPECIFIED ASTHMA, UNCOMPLICATED: ICD-10-CM

## 2024-04-08 DIAGNOSIS — Z79.810 LONG TERM (CURRENT) USE OF SELECTIVE ESTROGEN RECEPTOR MODULATORS (SERMS): ICD-10-CM

## 2024-04-08 PROCEDURE — C9399: CPT

## 2024-04-08 PROCEDURE — 19350 NIPPLE/AREOLA RECONSTRUCTION: CPT | Mod: 50

## 2024-04-08 RX ORDER — HYDROMORPHONE HYDROCHLORIDE 2 MG/ML
0.5 INJECTION INTRAMUSCULAR; INTRAVENOUS; SUBCUTANEOUS
Refills: 0 | Status: DISCONTINUED | OUTPATIENT
Start: 2024-04-08 | End: 2024-04-08

## 2024-04-08 RX ORDER — TRAMADOL HYDROCHLORIDE 50 MG/1
1 TABLET ORAL
Qty: 8 | Refills: 0
Start: 2024-04-08 | End: 2024-04-09

## 2024-04-08 RX ORDER — SODIUM CHLORIDE 9 MG/ML
1000 INJECTION, SOLUTION INTRAVENOUS
Refills: 0 | Status: DISCONTINUED | OUTPATIENT
Start: 2024-04-08 | End: 2024-04-08

## 2024-04-08 RX ORDER — COLCHICINE 0.6 MG
2 TABLET ORAL
Refills: 0 | DISCHARGE

## 2024-04-08 RX ORDER — APREPITANT 80 MG/1
40 CAPSULE ORAL ONCE
Refills: 0 | Status: COMPLETED | OUTPATIENT
Start: 2024-04-08 | End: 2024-04-08

## 2024-04-08 RX ORDER — FLUTICASONE PROPIONATE 220 MCG
2 AEROSOL WITH ADAPTER (GRAM) INHALATION
Refills: 0 | DISCHARGE

## 2024-04-08 RX ORDER — BUDESONIDE AND FORMOTEROL FUMARATE DIHYDRATE 160; 4.5 UG/1; UG/1
2 AEROSOL RESPIRATORY (INHALATION)
Refills: 0 | DISCHARGE

## 2024-04-08 RX ORDER — KETOROLAC TROMETHAMINE 30 MG/ML
15 SYRINGE (ML) INJECTION ONCE
Refills: 0 | Status: DISCONTINUED | OUTPATIENT
Start: 2024-04-08 | End: 2024-04-08

## 2024-04-08 RX ORDER — ACETAMINOPHEN 500 MG
1000 TABLET ORAL ONCE
Refills: 0 | Status: COMPLETED | OUTPATIENT
Start: 2024-04-08 | End: 2024-04-08

## 2024-04-08 RX ORDER — ONDANSETRON 8 MG/1
4 TABLET, FILM COATED ORAL ONCE
Refills: 0 | Status: DISCONTINUED | OUTPATIENT
Start: 2024-04-08 | End: 2024-04-08

## 2024-04-08 RX ORDER — APREPITANT 80 MG/1
1 CAPSULE ORAL
Refills: 0 | DISCHARGE

## 2024-04-08 RX ORDER — TAMOXIFEN CITRATE 20 MG/1
1 TABLET, FILM COATED ORAL
Refills: 0 | DISCHARGE

## 2024-04-08 RX ORDER — ACETAMINOPHEN 500 MG
2 TABLET ORAL
Refills: 0 | DISCHARGE

## 2024-04-08 RX ORDER — ALBUTEROL 90 UG/1
2 AEROSOL, METERED ORAL
Refills: 0 | DISCHARGE

## 2024-04-08 RX ADMIN — APREPITANT 40 MILLIGRAM(S): 80 CAPSULE ORAL at 11:40

## 2024-04-08 RX ADMIN — Medication 1000 MILLIGRAM(S): at 11:40

## 2024-04-08 RX ADMIN — SODIUM CHLORIDE 100 MILLILITER(S): 9 INJECTION, SOLUTION INTRAVENOUS at 13:59

## 2024-04-08 NOTE — ASU DISCHARGE PLAN (ADULT/PEDIATRIC) - CARE PROVIDER_API CALL
Loco Gastelum  Plastic Surgery  [address] [place] [id number]  Phone: [phone]  Fax: [phone]  Established Patient  Follow Up Time: 1 week

## 2024-04-08 NOTE — BRIEF OPERATIVE NOTE - NSICDXBRIEFPROCEDURE_GEN_ALL_CORE_FT
PROCEDURES:  Reconstruction, nipple, bilateral, with tattooing 08-Apr-2024 13:53:45  Alexandra Webster

## 2024-04-08 NOTE — ASU DISCHARGE PLAN (ADULT/PEDIATRIC) - ASU DC SPECIAL INSTRUCTIONSFT
Please follow up with Dr. Gastelum. Please call his office at the number provided to schedule this appointment. Please call within 48 hours of leaving the hospital.     Pain meds:   - Extra strength tylenol routinely. Please do not exceed 4,000mg in one day.   - Tramadol every 6 to 8 hours or as needed for severe pain     No heavy lifting, pushing or pulling    Antibiotics were given to your during your procedure    Dressing: please leave dressing in place until follow up. Please keep the dressing clean and dry while you recover.     Sleep on your back Please follow up with Dr. Gastelum. Please call his office at the number provided to schedule this appointment. Please call within 48 hours of leaving the hospital.     Pain meds:   - Extra strength tylenol routinely. Please do not exceed 4,000mg in one day.   - Tramadol every 6 to 8 hours or as needed for severe pain     No heavy lifting, pushing or pulling    Antibiotics were given to your during your procedure. Please take the prescribed antibiotic as directed.    Dressing: please leave dressing in place until follow up. Please keep the dressing clean and dry while you recover.     Sleep on your back

## 2024-04-11 LAB
25(OH)D3 SERPL-MCNC: 14.5 NG/ML
ALBUMIN SERPL ELPH-MCNC: 3.7 G/DL
ALP BLD-CCNC: 64 U/L
ALT SERPL-CCNC: 24 U/L
ANION GAP SERPL CALC-SCNC: 9 MMOL/L
AST SERPL-CCNC: 29 U/L
BILIRUB SERPL-MCNC: 0.9 MG/DL
BUN SERPL-MCNC: 12 MG/DL
CALCIUM SERPL-MCNC: 9.7 MG/DL
CHLORIDE SERPL-SCNC: 109 MMOL/L
CO2 SERPL-SCNC: 27 MMOL/L
CREAT SERPL-MCNC: 0.7 MG/DL
EGFR: 98 ML/MIN/1.73M2
ESTRADIOL SERPL-MCNC: 11.33 PG/ML
FOLATE SERPL-MCNC: 12.9 NG/ML
FSH SERPL-MCNC: 23.91 IU/L
GLUCOSE SERPL-MCNC: 124 MG/DL
LH SERPL-ACNC: 11.32 IU/L
MAGNESIUM SERPL-MCNC: 2 MG/DL
METHYLMALONATE SERPL-SCNC: 179 NMOL/L
POTASSIUM SERPL-SCNC: 4 MMOL/L
PROT SERPL-MCNC: 7.5 G/DL
SODIUM SERPL-SCNC: 145 MMOL/L
TSH SERPL-ACNC: 1.07 UIU/ML
VIT B12 SERPL-MCNC: 650 PG/ML

## 2024-04-12 ENCOUNTER — NON-APPOINTMENT (OUTPATIENT)
Age: 64
End: 2024-04-12

## 2024-04-12 ENCOUNTER — APPOINTMENT (OUTPATIENT)
Dept: PLASTIC SURGERY | Facility: CLINIC | Age: 64
End: 2024-04-12
Payer: COMMERCIAL

## 2024-04-12 PROBLEM — J30.2 OTHER SEASONAL ALLERGIC RHINITIS: Chronic | Status: ACTIVE | Noted: 2024-03-28

## 2024-04-12 PROBLEM — C50.919 MALIGNANT NEOPLASM OF UNSPECIFIED SITE OF UNSPECIFIED FEMALE BREAST: Chronic | Status: ACTIVE | Noted: 2024-03-28

## 2024-04-12 PROCEDURE — 99024 POSTOP FOLLOW-UP VISIT: CPT

## 2024-04-12 NOTE — ASSESSMENT
[FreeTextEntry1] : 64F PMH chest pain on exertion, post nasal drip, gout, here currently being worked up, here with , [facility] August 2023 BL SSM and BL ABDIRAHMAN after L Br Ca. In november 2023, symmetrizing fat grafting BL breast. Initially had L Br lumpectomy 2006w/ radiation now here for nipple reconstruction, came here because she is [employer] employee, and [employer] health insurance no longer will cover costs at prior PRS surgeon   Pt is POD# 4 s/p creation and tattooing of bilateral NACs. Doing well   - All bandages changed: Baci/adaptic/telfa/dome/tegaderm - No bras - do not get area wet - continue abx to completion - Tylenol q6 hours or pRn for discomfort - s/s of infection reviewed - do not sleep on chest - no heavy lifting or strenuous activity  - f/u next week for suture removal

## 2024-04-12 NOTE — HISTORY OF PRESENT ILLNESS
[FreeTextEntry1] : 64F PMH chest pain on exertion, post nasal drip, gout, here currently being worked up, here with , [facility] August 2023 BL SSM and BL ABDIRAHMAN after L Br Ca. In November 2023, symmetrizing fat grafting BL breast. initially had L Br lumpectomy 2006w/ radiation now here for nipple reconstruction, came here because she is [employer] employee, and [employer] health insurance no longer covers her prior plastic surgeon.   non-smoker due for echo tomorrow w/ Dr. 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  Interval hx (4/12/24): Pt is POD# 4 s/p creation and tattooing of bilateral NACs. Pt is doing well. Here for bandage change. Has been taking all post op meds as prescribed, has been sleeping on her back and avoiding any pressure in the area. Denies f/c/v/n. Sore throat from intubation improving. Here with her

## 2024-04-12 NOTE — PHYSICAL EXAM
[de-identified] : Well developed, well nourished in NAD  [de-identified] : Atraumatic, normocephalic  [de-identified] : DAYs [de-identified] : Normal ears, normal nose and normal lips  [de-identified] : Bilateral breast ABDIRAHMAN flaps well perfused with well healing scars and good breast symmetry. Bilateral nipples viable with good projection, healing well with prolene sutures in place. NAC tattooing with excellent take. No cellulitis, erythema, purulent drainage or signs of infection

## 2024-04-18 ENCOUNTER — RX RENEWAL (OUTPATIENT)
Age: 64
End: 2024-04-18

## 2024-04-19 ENCOUNTER — APPOINTMENT (OUTPATIENT)
Dept: PLASTIC SURGERY | Facility: CLINIC | Age: 64
End: 2024-04-19
Payer: COMMERCIAL

## 2024-04-19 PROBLEM — J45.909 UNSPECIFIED ASTHMA, UNCOMPLICATED: Chronic | Status: ACTIVE | Noted: 2024-03-28

## 2024-04-19 PROCEDURE — 99024 POSTOP FOLLOW-UP VISIT: CPT

## 2024-05-02 ENCOUNTER — NON-APPOINTMENT (OUTPATIENT)
Age: 64
End: 2024-05-02

## 2024-05-02 ENCOUNTER — APPOINTMENT (OUTPATIENT)
Dept: INFECTIOUS DISEASE | Facility: CLINIC | Age: 64
End: 2024-05-02

## 2024-05-02 ENCOUNTER — OUTPATIENT (OUTPATIENT)
Dept: OUTPATIENT SERVICES | Facility: HOSPITAL | Age: 64
LOS: 1 days | End: 2024-05-02
Payer: COMMERCIAL

## 2024-05-02 VITALS
BODY MASS INDEX: 27.42 KG/M2 | SYSTOLIC BLOOD PRESSURE: 140 MMHG | HEART RATE: 94 BPM | DIASTOLIC BLOOD PRESSURE: 94 MMHG | HEIGHT: 59 IN | TEMPERATURE: 98.3 F | OXYGEN SATURATION: 100 % | WEIGHT: 136 LBS | RESPIRATION RATE: 14 BRPM

## 2024-05-02 DIAGNOSIS — Z98.890 OTHER SPECIFIED POSTPROCEDURAL STATES: Chronic | ICD-10-CM

## 2024-05-02 DIAGNOSIS — Z11.3 ENCOUNTER FOR SCREENING FOR INFECTIONS WITH A PREDOMINANTLY SEXUAL MODE OF TRANSMISSION: ICD-10-CM

## 2024-05-02 DIAGNOSIS — Z00.00 ENCOUNTER FOR GENERAL ADULT MEDICAL EXAMINATION WITHOUT ABNORMAL FINDINGS: ICD-10-CM

## 2024-05-02 PROCEDURE — 99204 OFFICE O/P NEW MOD 45 MIN: CPT

## 2024-05-03 ENCOUNTER — APPOINTMENT (OUTPATIENT)
Dept: PLASTIC SURGERY | Facility: CLINIC | Age: 64
End: 2024-05-03
Payer: COMMERCIAL

## 2024-05-03 DIAGNOSIS — Z90.13 ACQUIRED ABSENCE OF BILATERAL BREASTS AND NIPPLES: ICD-10-CM

## 2024-05-03 LAB
HBV CORE IGG+IGM SER QL: NONREACTIVE
HBV E AB SER QL: NONREACTIVE
HBV E AG SER QL: NONREACTIVE
HBV SURFACE AG SER QL: NONREACTIVE
HCV AB SER QL: NONREACTIVE
HCV RNA SERPL NAA+PROBE-LOG IU: NOT DETECTED LOGIU/ML
HCV S/CO RATIO: 0.09 S/CO
HEPB DNA PCR INT: NOT DETECTED IU/ML
HEPB DNA PCR LOG: NOT DETECTED LOGIU/ML
HEPC RNA INTERP: NOT DETECTED IU/ML
HIV1 RNA # SERPL NAA+PROBE: NOT DETECTED COPIES/ML
HIV1+2 AB SPEC QL IA.RAPID: NONREACTIVE
VIRAL LOAD INTERP: NOT DETECTED
VIRAL LOAD LOG: NOT DETECTED LOGCOPIES/ML

## 2024-05-03 PROCEDURE — 99024 POSTOP FOLLOW-UP VISIT: CPT

## 2024-05-03 NOTE — ASSESSMENT
[FreeTextEntry1] : 64F PMH chest pain on exertion, post nasal drip, gout, here currently being worked up, here with , [facility] August 2023 BL SSM and BL ABDIRAHMAN after L Br Ca. In november 2023, symmetrizing fat grafting BL breast. Initially had L Br lumpectomy 2006w/ radiation now here for nipple reconstruction, came here because she is [employer] employee, and [employer] health insurance no longer will cover costs at prior PRS surgeon  Pt is POD# 25s/p creation and tattooing of bilateral NACs. Doing well and happy with results  - Ok to continue daily Eucerin / Aquaphor to area - Only a very light bra, recommended avoiding compression / friction on NAC - ok to shower - s/s of infection reviewed - cleared for activity / work - f/u 2-3 months with

## 2024-05-03 NOTE — PHYSICAL EXAM
[de-identified] : Well developed, well nourished in NAD  [de-identified] : Atraumatic, normocephalic  [de-identified] : DAYs [de-identified] : Normal ears, normal nose and normal lips  [de-identified] : Bilateral breast ABDIRAHMAN flaps well perfused with well healing scars and good breast symmetry. Bilateral nipples viable with good projection, healing well,  NAC tattooing with excellent take. No cellulitis, erythema, purulent drainage or signs of infection

## 2024-05-03 NOTE — ASSESSMENT
[FreeTextEntry1] : #Screen for STD/Hepatitis Infections -HIV screen negative.  -Will check for Hep B and C, Quanteferon. -Check for Immunity for MMR and varicella.  -Will discuss about further steps if any of the tests come back positive, otherwise encouraged her to get vaccinated for hepatitis B. (Reports bad outcomes from previous vaccines)   #History of breast Cancer #Arthritis, Dyslipidemia

## 2024-05-03 NOTE — PHYSICAL EXAM
[General Appearance - Alert] : alert [General Appearance - Well Nourished] : well nourished [Auscultation Breath Sounds / Voice Sounds] : lungs were clear to auscultation bilaterally [Heart Sounds] : normal S1 and S2 [Edema] : there was no peripheral edema [Abdomen Soft] : soft [Abdomen Tenderness] : non-tender [] : no rash [Cranial Nerves] : cranial nerves 2-12 were intact [No Focal Deficits] : no focal deficits [Oriented To Time, Place, And Person] : oriented to person, place, and time

## 2024-05-03 NOTE — HISTORY OF PRESENT ILLNESS
[FreeTextEntry1] : 64-year-old woman with a history of Stage 1A T1aN0 ER+/PA+/HER2- left breast cancer s/p left lumpectomy/SLNB on 4/17/2006, adjuvant radiation therapy to the left breast and now on tamoxifen s/p 8/4/23 bilateral mastectomy/L SLNB/ABDIRAHMAN reconstruction, 11/2/23 bilateral breast reconstruction revision/fat graft. Also has a history of arthritis and dyslipidemia. She is coming to the ID clinic for evaluation for Hepatitis Infection.  Overall doing well. She mentions that her spouse is recently diagnosed with autoimmune disease and hepatitis B (maternal in origin). She wants herself to be screened for Hepatitis infections.  Denies any acute complaints.  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

## 2024-05-04 LAB
HBV SURFACE AB SER QL: NONREACTIVE
MEV IGG FLD QL IA: >300 AU/ML
MEV IGG+IGM SER-IMP: POSITIVE
MUV AB SER-ACNC: POSITIVE
MUV IGG SER QL IA: 72.5 AU/ML
RUBV IGG FLD-ACNC: 1.4 INDEX
RUBV IGG SER-IMP: POSITIVE
VZV AB TITR SER: POSITIVE
VZV IGG SER IF-ACNC: 2994 INDEX

## 2024-05-06 LAB
M TB IFN-G BLD-IMP: NEGATIVE
QUANTIFERON TB PLUS MITOGEN MINUS NIL: 2.69 IU/ML
QUANTIFERON TB PLUS NIL: 0.01 IU/ML
QUANTIFERON TB PLUS TB1 MINUS NIL: 0.04 IU/ML
QUANTIFERON TB PLUS TB2 MINUS NIL: 0.07 IU/ML

## 2024-05-09 ENCOUNTER — APPOINTMENT (OUTPATIENT)
Dept: INFECTIOUS DISEASE | Facility: CLINIC | Age: 64
End: 2024-05-09

## 2024-05-09 ENCOUNTER — OUTPATIENT (OUTPATIENT)
Dept: OUTPATIENT SERVICES | Facility: HOSPITAL | Age: 64
LOS: 1 days | End: 2024-05-09
Payer: COMMERCIAL

## 2024-05-09 DIAGNOSIS — Z98.890 OTHER SPECIFIED POSTPROCEDURAL STATES: Chronic | ICD-10-CM

## 2024-05-09 DIAGNOSIS — Z11.3 ENCOUNTER FOR SCREENING FOR INFECTIONS WITH A PREDOMINANTLY SEXUAL MODE OF TRANSMISSION: ICD-10-CM

## 2024-05-09 DIAGNOSIS — Z00.00 ENCOUNTER FOR GENERAL ADULT MEDICAL EXAMINATION WITHOUT ABNORMAL FINDINGS: ICD-10-CM

## 2024-05-09 DIAGNOSIS — Z20.828 CONTACT WITH AND (SUSPECTED) EXPOSURE TO OTHER VIRAL COMMUNICABLE DISEASES: ICD-10-CM

## 2024-05-09 PROCEDURE — 99213 OFFICE O/P EST LOW 20 MIN: CPT

## 2024-05-09 NOTE — HISTORY OF PRESENT ILLNESS
[FreeTextEntry1] : 64-year-old woman with a history of Stage 1A T1aN0 ER+/NV+/HER2- left breast cancer s/p left lumpectomy/SLNB on 4/17/2006, adjuvant radiation therapy to the left breast and now on tamoxifen s/p 8/4/23 bilateral mastectomy/L SLNB/ABDIRAHMAN reconstruction, 11/2/23 bilateral breast reconstruction revision/fat graft. Also has a history of arthritis and dyslipidemia. Followed up with her via tele-visit. Denies any acute complaints. Discussed test results with the patient.

## 2024-05-09 NOTE — ASSESSMENT
Patient is requesting a medication refill - medication is on active medication list    Patient is currently OUT of the requested medication.    RX Name and Dose:  (copy from med list)  Methylphenidate HCl ER 27 MG Oral Tablet Extended Release New     Add as: methylpheniDATE ER (CONCERTA) 27 MG CR tablet     TAKE 1 TABLET DAILY FOR ADHD.         Duration: 30 days    Pharmacy  No Pharmacies Listed    Caller Information       Type Contact Phone    12/06/2018 04:12 PM Phone (Incoming) curt (Father) [phone]          Alternative phone number: n/a    Turnaround time given to caller:   \"This message will be sent to [state Provider's name]. The clinical team will fulfill your request as soon as they review your message when the office opens tomorrow.\"   [FreeTextEntry1] : #Screen for STD/Hepatitis Infections -HIV screen negative. -Hepatitis B and C screen negative, she is also non-immune to hepatitis B.  -MMR and varicella Immune. Quantiferon negative.  -Discussed that currently she does not have any viral infection. She will clarify what stage the spouse is in for hepatitis B as he may or may not need treatment for hepatitis B. -Discussed risk precautions to avoid acquiring Hep B infection. Strongly encouraged that she should get vaccinated. She will think about it.   #History of breast Cancer #Arthritis, Dyslipidemia.  RTC to as needed.  [Risk Reduction] : risk reduction [Medical Care Issues] : medical care issues [Sexuality / Safer Sex] : sexuality/safer sex

## 2024-05-09 NOTE — REASON FOR VISIT
[Home] : at home, [unfilled] , at the time of the visit. [Medical Office: ([facility])___] : at the medical office located in  [Follow-Up: _____] : a [unfilled] follow-up visit

## 2024-05-20 ENCOUNTER — RX RENEWAL (OUTPATIENT)
Age: 64
End: 2024-05-20

## 2024-06-10 ENCOUNTER — NON-APPOINTMENT (OUTPATIENT)
Age: 64
End: 2024-06-10

## 2024-06-10 ENCOUNTER — APPOINTMENT (OUTPATIENT)
Dept: OPHTHALMOLOGY | Facility: CLINIC | Age: 64
End: 2024-06-10
Payer: COMMERCIAL

## 2024-06-10 PROCEDURE — 92250 FUNDUS PHOTOGRAPHY W/I&R: CPT

## 2024-06-10 PROCEDURE — 92004 COMPRE OPH EXAM NEW PT 1/>: CPT

## 2024-06-10 PROCEDURE — 92136 OPHTHALMIC BIOMETRY: CPT

## 2024-06-10 PROCEDURE — 92025 CPTRIZED CORNEAL TOPOGRAPHY: CPT

## 2024-06-17 ENCOUNTER — RX RENEWAL (OUTPATIENT)
Age: 64
End: 2024-06-17

## 2024-06-17 RX ORDER — FLUTICASONE PROPIONATE 50 UG/1
50 SPRAY, METERED NASAL DAILY
Qty: 48 | Refills: 0 | Status: ACTIVE | COMMUNITY
Start: 2024-02-22 | End: 1900-01-01

## 2024-06-20 ENCOUNTER — APPOINTMENT (OUTPATIENT)
Dept: MRI IMAGING | Facility: HOSPITAL | Age: 64
End: 2024-06-20

## 2024-06-20 ENCOUNTER — OUTPATIENT (OUTPATIENT)
Dept: OUTPATIENT SERVICES | Facility: HOSPITAL | Age: 64
LOS: 1 days | End: 2024-06-20
Payer: COMMERCIAL

## 2024-06-20 ENCOUNTER — RESULT REVIEW (OUTPATIENT)
Age: 64
End: 2024-06-20

## 2024-06-20 DIAGNOSIS — Z98.890 OTHER SPECIFIED POSTPROCEDURAL STATES: Chronic | ICD-10-CM

## 2024-06-20 PROCEDURE — 73721 MRI JNT OF LWR EXTRE W/O DYE: CPT | Mod: 26,RT

## 2024-06-20 PROCEDURE — 73721 MRI JNT OF LWR EXTRE W/O DYE: CPT

## 2024-06-21 ENCOUNTER — APPOINTMENT (OUTPATIENT)
Dept: HEMATOLOGY ONCOLOGY | Facility: CLINIC | Age: 64
End: 2024-06-21
Payer: COMMERCIAL

## 2024-06-21 VITALS
WEIGHT: 133 LBS | HEIGHT: 59 IN | SYSTOLIC BLOOD PRESSURE: 136 MMHG | OXYGEN SATURATION: 98 % | TEMPERATURE: 98.8 F | HEART RATE: 87 BPM | RESPIRATION RATE: 18 BRPM | BODY MASS INDEX: 26.81 KG/M2 | DIASTOLIC BLOOD PRESSURE: 88 MMHG

## 2024-06-21 DIAGNOSIS — C50.919 MALIGNANT NEOPLASM OF UNSPECIFIED SITE OF UNSPECIFIED FEMALE BREAST: ICD-10-CM

## 2024-06-21 DIAGNOSIS — M85.852 OTHER SPECIFIED DISORDERS OF BONE DENSITY AND STRUCTURE, LEFT THIGH: ICD-10-CM

## 2024-06-21 DIAGNOSIS — R94.5 ABNORMAL RESULTS OF LIVER FUNCTION STUDIES: ICD-10-CM

## 2024-06-21 PROCEDURE — 99214 OFFICE O/P EST MOD 30 MIN: CPT

## 2024-06-21 RX ORDER — ATORVASTATIN CALCIUM 10 MG/1
10 TABLET, FILM COATED ORAL
Qty: 90 | Refills: 3 | Status: DISCONTINUED | COMMUNITY
Start: 2024-03-05 | End: 2024-06-21

## 2024-06-21 RX ORDER — TAMOXIFEN CITRATE 20 MG/1
20 TABLET, FILM COATED ORAL DAILY
Qty: 90 | Refills: 3 | Status: ACTIVE | COMMUNITY
Start: 1900-01-01 | End: 1900-01-01

## 2024-06-21 NOTE — HISTORY OF PRESENT ILLNESS
[Disease: _____________________] : Disease: [unfilled] [T: ___] : T[unfilled] [N: ___] : N[unfilled] [M: ___] : M[unfilled] [AJCC Stage: ____] : AJCC Stage: [unfilled] [de-identified] : 64-year-old woman with a history of breast cancer is referred by Dr. Gupta to establish care. She was under the care of medical oncologist Annika Taylor, but she is no longer in network. Here today for f/u.   She had Stage 1A T1aN0 ER+/IL+/HER2- left breast cancer s/p left lumpectomy/SLNB on 4/17/2006 (Dr. Capri Ambriz @ [facility]), adjuvant radiation therapy to the left breast for 2 months followed by tamoxifen for 5 years.  She was then diagnosed with Stage 1A T2N0 ER+/IL+/HER2- left ILC in 2023 s/p 8/4/23 bilateral mastectomy/L SLNB/ABDIRAHMAN reconstruction (Flori Chaney/Salas Pagan @ [facility]), 11/2/23 bilateral breast reconstruction revision/fat graft.  She was taking anastrozole since end of August-November 2023 (discontinued due to arthralgia/bone pain) and was switched to tamoxifen on 11/30/23.  5/19/23 b/l MG: There are postsurgical changes in the left breast at 12:00.  There is increasing density and distortion at 12:00 measuring approximately 13 mm.  Asymmetry in the anterior upper left breast.  Within the right breast no discrete mass or calcified lesions identified.  5/26/23 L MG/US: 2.1 cm x 1.8 cm x 1 cm irregular left breast 12:00 mass is highly suggestive of malignancy.  0.4 cm x 0.3 cm x 0.3 cm oval mass in the left breast at 1:00 is hichly suggestive of malignancy.  6/8/23 US Left breast 12:00 retroareolar biopsy: ILC, classic type, grade 2, measuring up to 9 mm in biopsy. %, IL 5%, Ki-67 20%, HER2 2+ equivocal FISH negative The small suspicious nodule at 1:00 was not biopsied due to increased peripheral vascularity and risk of bleeding.  6/19/23 MR breast:  1. 2.3 cm enhancing mass at left 12:00/retro extending to the base of the nipple and also causing nipple retraction, consistent with the patient's newly diagnosed malignancy at left 12:00.  There is an adjacent highly suspicious enhancing lesion at left 7:00/drew.   2. Other highly suspicious probable satellite lesions scattered throughout the left breast such as at left 1:00 as seen on recent ultrasound and left 6:00 in addition to other scattered stippled enhancing foci.  2nd look targeted left whole breast ultrasound is recommended.  US guided biopsy of the left 1:00 site is recommended in addition to 1-2 other sites to determine whether the left breast has multicentric disease.  Biopsy of additional sites could be performed with MRI guidance if nothing is seen on conventional imaging. BI-RADS 4  6/20/23 DEXA: osteopenia of the left femoral neck (T score -1.2)  8/4/23 b/l mastectomy/L SLNB: 1. RIGHT mastectomy: benign breast parenchyma, negative for carcinoma. 2. 0/2 L SLNs 3. LEFT mastectomy: invasive lobular carcinoma, Chiqui grade 2, measuring 30 mm.  Negative margins. LCIS. ER 99%, IL 40%, Ki-67 15%, HER2 2+ equivocal FISH negative  [de-identified] : moderately differentiated invasive lobular carcinoma [de-identified] : ER %, NC 20-40%, HER2 2+ FISH negative, Ki-67 15-20%, Oncotype RS 17 [de-identified] : Genetic testing 3/2010, 3/2016, 6/2023 negative [de-identified] : Feeling ok. No body aches on tamoxifen.  Denies sob/betancur, leg swelling,states she can tolerate Tamoxifen better than the AI. Seeing Dr Hollis at [facility] and plans to undergo cataract surgery. States cataracts present /diagnosed in 2021 (not when patient was on Tamoxfien yet).

## 2024-06-21 NOTE — ASSESSMENT
[FreeTextEntry1] : 64-year-old woman with a history of breast cancer is referred by Dr. Gupta to establish care. She was under the care of medical oncologist Annika Taylor, but she is no longer in network.  She had Stage 1A T1aN0 ER+/CT+/HER2- left breast cancer s/p left lumpectomy/SLNB on 4/17/2006 (Dr. Capri Ambriz @ [facility]), adjuvant radiation therapy to the left breast for 2 months followed by tamoxifen for 5 years.  She was then diagnosed with Stage 1A T2N0 ER+/CT+/HER2- left ILC in 2023 s/p 8/4/23 bilateral mastectomy/L SLNB/ABDIRAHMAN reconstruction (Flori Chaney/Salas Pagan @ [facility]), 11/2/23 bilateral breast reconstruction revision/fat graft.  She was taking anastrozole since end of August-November 2023 (discontinued due to arthralgia/bone pain) and was switched to tamoxifen on 11/30/23.  Pt notes possible LFT abnormalities while on Tamoxifen and taking it only for 3.5yrs because of that. She also expresses concern over bone density loss while on AI and states she prefers Tamoxifen in part because of that to the AI.   Last DEXA osteopenia in 6/2023, T score -1.2.   Reviewed with patient two different options including switching to Exemestane vs staying on Tamoxifen. I reviewed that AIs are slightly more effective than Tamoxifen in terms of breast cancer outcomes but that AIs such as Exemestane are associated with a decrease in bone density whereas Tamoxifen in a postmenopausal woman is not. I also reviewed that since she is tolerating Tamoxifen well it is an option to continue it; my concern with it is h/o LFT abnormalities that necessitated her to interrupt her therapy. Patient preferred to stay on Tamoxifen rather than switch to Exemestane and I therefore recommended to come back for a more frequent follow up asking to monitor her LFTs more frequently, with which she was in agreement with.   Pt is ALYCIA as of exam from 6/21/24 tolerating Tamoxifen well. To continue Clifton 20mg/day, reminded to see GYN and opthalmologist annually while on Tamoxifen for routine check ups given risk of cataracts and endometrial ca.  I also recommended to check the DEXA scan at a year's time in 6/2024, to take vitamin D/Ca.   To check labs today for vitamin D level and CMP to monitor LFTs given history of LFT abnormalities while on Tamoxifen. Asked pt to continue regular follow up with another oncologist at [facility] since I will be leaving practice, with Dr Robertson as per patient preference.  RTC in 3 months for follow up w/ Dr Robertson as per pt preference.

## 2024-06-21 NOTE — PHYSICAL EXAM
[Restricted in physically strenuous activity but ambulatory and able to carry out work of a light or sedentary nature] : Status 1- Restricted in physically strenuous activity but ambulatory and able to carry out work of a light or sedentary nature, e.g., light house work, office work [Normal] : affect appropriate [de-identified] : s/p bilateral mastectomies with ABDIRAHMAN reconstruction, no palpable lesions in either axilla, supraclavicular areas nor over chest wall

## 2024-06-25 LAB
25(OH)D3 SERPL-MCNC: 38 NG/ML
ALBUMIN SERPL ELPH-MCNC: 3.8 G/DL
ALP BLD-CCNC: 80 U/L
ALT SERPL-CCNC: 31 U/L
ANION GAP SERPL CALC-SCNC: 9 MMOL/L
AST SERPL-CCNC: 42 U/L
BILIRUB SERPL-MCNC: 0.5 MG/DL
BUN SERPL-MCNC: 15 MG/DL
CALCIUM SERPL-MCNC: 9.4 MG/DL
CHLORIDE SERPL-SCNC: 110 MMOL/L
CO2 SERPL-SCNC: 27 MMOL/L
CREAT SERPL-MCNC: 0.7 MG/DL
EGFR: 98 ML/MIN/1.73M2
GLUCOSE SERPL-MCNC: 111 MG/DL
HCT VFR BLD CALC: 40.5 %
HGB BLD-MCNC: 13.4 G/DL
LYMPHOCYTES # BLD AUTO: 2.8 K/UL
LYMPHOCYTES NFR BLD AUTO: 44.6 %
MAN DIFF?: NO
MCHC RBC-ENTMCNC: 30.8 PG
MCHC RBC-ENTMCNC: 33.1 GM/DL
MCV RBC AUTO: 93.1 FL
NEUTROPHILS # BLD AUTO: 2.9 K/UL
NEUTROPHILS NFR BLD AUTO: 45.2 %
PLATELET # BLD AUTO: 236 K/UL
POTASSIUM SERPL-SCNC: 4.1 MMOL/L
PROT SERPL-MCNC: 7.6 G/DL
RBC # BLD: 4.35 M/UL
RBC # FLD: 12 %
SODIUM SERPL-SCNC: 146 MMOL/L
TSH SERPL-ACNC: 1.07 UIU/ML
WBC # FLD AUTO: 6.3 K/UL

## 2024-06-26 ENCOUNTER — NON-APPOINTMENT (OUTPATIENT)
Age: 64
End: 2024-06-26

## 2024-07-12 ENCOUNTER — OUTPATIENT (OUTPATIENT)
Dept: OUTPATIENT SERVICES | Facility: HOSPITAL | Age: 64
LOS: 1 days | End: 2024-07-12

## 2024-07-12 ENCOUNTER — APPOINTMENT (OUTPATIENT)
Dept: RADIOLOGY | Facility: HOSPITAL | Age: 64
End: 2024-07-12
Payer: COMMERCIAL

## 2024-07-12 DIAGNOSIS — Z98.890 OTHER SPECIFIED POSTPROCEDURAL STATES: Chronic | ICD-10-CM

## 2024-07-12 PROCEDURE — 77085 DXA BONE DENSITY AXL VRT FX: CPT

## 2024-07-12 PROCEDURE — 77085 DXA BONE DENSITY AXL VRT FX: CPT | Mod: 26

## 2024-07-18 ENCOUNTER — APPOINTMENT (OUTPATIENT)
Dept: PLASTIC SURGERY | Facility: CLINIC | Age: 64
End: 2024-07-18

## 2024-08-20 ENCOUNTER — APPOINTMENT (OUTPATIENT)
Dept: HEART AND VASCULAR | Facility: CLINIC | Age: 64
End: 2024-08-20
Payer: COMMERCIAL

## 2024-08-20 ENCOUNTER — NON-APPOINTMENT (OUTPATIENT)
Age: 64
End: 2024-08-20

## 2024-08-20 VITALS
WEIGHT: 134 LBS | HEART RATE: 91 BPM | OXYGEN SATURATION: 98 % | HEIGHT: 59 IN | BODY MASS INDEX: 27.01 KG/M2 | DIASTOLIC BLOOD PRESSURE: 87 MMHG | SYSTOLIC BLOOD PRESSURE: 129 MMHG

## 2024-08-20 DIAGNOSIS — R94.5 ABNORMAL RESULTS OF LIVER FUNCTION STUDIES: ICD-10-CM

## 2024-08-20 DIAGNOSIS — E66.3 OVERWEIGHT: ICD-10-CM

## 2024-08-20 DIAGNOSIS — R73.03 PREDIABETES.: ICD-10-CM

## 2024-08-20 DIAGNOSIS — J45.909 UNSPECIFIED ASTHMA, UNCOMPLICATED: ICD-10-CM

## 2024-08-20 DIAGNOSIS — I25.10 ATHEROSCLEROTIC HEART DISEASE OF NATIVE CORONARY ARTERY W/OUT ANGINA PECTORIS: ICD-10-CM

## 2024-08-20 DIAGNOSIS — E78.5 HYPERLIPIDEMIA, UNSPECIFIED: ICD-10-CM

## 2024-08-20 PROCEDURE — 93000 ELECTROCARDIOGRAM COMPLETE: CPT

## 2024-08-20 PROCEDURE — 99215 OFFICE O/P EST HI 40 MIN: CPT

## 2024-08-20 RX ORDER — ATORVASTATIN CALCIUM 10 MG/1
10 TABLET, FILM COATED ORAL DAILY
Refills: 0 | Status: ACTIVE | COMMUNITY

## 2024-08-20 NOTE — HISTORY OF PRESENT ILLNESS
[FreeTextEntry1] : 64-year-old woman, with a past medical history of breast cancer, and asthma, presenting for follow-up. Patient was diagnosed with left breast cancer in situ in 2006, and underwent radiation therapy of the left breast for 2 months followed by tamoxifen for 5 years. She was again diagnosed with breast cancer in 2023 and is now status post bilateral mastectomy and chemotherapy (anastrozole). She presently denies chest pain, shortness of breath/dyspnea on exertion, palpitations, dizziness/loss of consciousness, paroxysmal nocturnal dyspnea, orthopnea, headaches, abdominal pain, and lower extremity swelling; and is able to ambulate >10 blocks and >2 flights of stairs without inhibition by chest pain or dyspnea on exertion. Given prior chest discomfort which has now resolved, she underwent a TTE which demonstrated normal biventricular systolic function, and a CCTA with a calcium score of 0 and minimal CAD (details below). She is now planned to undergo bilateral cataract extractions in September, 2024.

## 2024-08-20 NOTE — CARDIOLOGY SUMMARY
[de-identified] : 8/20/24: NSR at 85 bpm 3/5/24: NSR at 77 bpm 1/30/24: NSR at 76 bpm 11/12/20: sinus tachycardia ()   [de-identified] : TTE 2/9/24: 1. Normal left and right ventricular size and systolic function.  2. No significant valvular disease.  3. No evidence of pulmonary hypertension.  4. No pericardial effusion.  5. No prior echo is available for comparison.   [de-identified] : CCTA 2/9/24: 1. The calcium score is 0 Agatston units. 2. Minimal proximal LAD stenosis 3. Remaining coronary segments appear normal

## 2024-08-20 NOTE — ASSESSMENT
[FreeTextEntry1] : 64-year-old woman, with a past medical history of breast cancer, and asthma, presenting for follow-up.   Preoperative Cardiovascular Assessment: Patient is planned to undergo bilateral cataract extractions in September, 2024. She is able to ambulate >10 blocks and >2 flights of stairs without inhibition by CP or FLORES. TTE demonstrated normal biventricular systolic function, and a CCTA with a calcium score of 0 and minimal CAD (details above). - RCRI Score 1, Class II risk: 6.0% 30-day risk of death, MI, or cardiac arrest - patient able to achieve >4 METs w/o anginal symptoms or anginal equivalents - patient is at low risk for a low risk surgery - no cardiac contraindications to the planned procedure  CP/FLORES: RESOLVED. Given prior chest discomfort which has now resolved, she underwent a TTE which demonstrated normal biventricular systolic function, and a CCTA with a calcium score of 0 and minimal CAD (details below).  - patient instructed to contact me and/or seek immediate medical attention if symptoms recur or worsen  HLD: Latest lipid profile on 1/30/24 with a , , HDL 60, and . Minimal CAD noted on CCTA. - atorvastatin 10mg PO QHS started at last visit - lipid profile obtained today   F/u s/p surgery.

## 2024-08-23 LAB
CHOLEST SERPL-MCNC: 158 MG/DL
ESTIMATED AVERAGE GLUCOSE: 126 MG/DL
HBA1C MFR BLD HPLC: 6 %
HDLC SERPL-MCNC: 69 MG/DL
LDLC SERPL CALC-MCNC: 63 MG/DL
NONHDLC SERPL-MCNC: 88 MG/DL
TRIGL SERPL-MCNC: 149 MG/DL

## 2024-08-30 NOTE — ASU PATIENT PROFILE, ADULT - NSICDXPASTMEDICALHX_GEN_ALL_CORE_FT
PAST MEDICAL HISTORY:  Asthma     Breast cancer     Hyperlipemia     Seasonal allergies      PAST MEDICAL HISTORY:  Asthma     Breast cancer     Gout     Hyperlipemia     Seasonal allergies

## 2024-08-30 NOTE — ASU PATIENT PROFILE, ADULT - NSICDXPASTSURGICALHX_GEN_ALL_CORE_FT
PAST SURGICAL HISTORY:  H/O left knee surgery menisectomy    H/O rotator cuff surgery right    History of breast surgery bilateral    S/P breast reconstruction     Status post breast reconstruction b/l

## 2024-08-30 NOTE — ASU PATIENT PROFILE, ADULT - NS PREOP UNDERSTANDS INFO
bring photo id , insurance, credit cards. nothing to eat from midnight, clears till 05:30 am dos. no smoking/alcohol/drugs/jewelry/valuables . wear loose comfort clothes. must have an escort. no candy/chewing gum /no milk. address and phone # provided/yes

## 2024-09-02 NOTE — OPERATIVE REPORT - OPERATIVE RPOSRT DETAILS
ASSISTANT SURGEON:  Esther Vee MD    DATE OF SURGERY:  9/3/24    ANESTHESIA:  MAC/TOPICAL	    ESTIMATED BLOOD LOSS: < 1mL	    COMPLICATIONS:  none		    PREOPERATIVE DIAGNOSIS:  Cataract, Left eye    POSTOPERATIVE DIAGNOSIS:  Cataract, Left eye    OPERATIVE PROCEDURE:  Phacoemulsification with insertion  Of posterior chamber intraocular lens Left eye    LENS IMPLANT CCAOTO +20.0    PROCEDURE:	The patient was brought into the operating room and placed supine on the operating room table. After uneventful induction of neuroleptic anesthesia, lidocaine gel was instilled into the operative eye achieving sufficient anesthesia.  The patient was then prepped and draped in the usual sterile fashion.  A wire lid speculum was then inserted into the operative eye giving a wide palpebral fissure.      A jovani knife was used to perform paracenteses through clear cornea at the 12 and 6 o’clock limbal positions.  The anterior chamber was irrigated with lidocaine 1% nonpreserved.  Viscoelastic was then used to maintain the anterior chamber.  A keratome was used to create a clear corneal incision just inside the temporal limbus.  A bent 25 gauge needle was then used to initiate an anterior capsulorhexis, which was completed with the use of capsulorhexis forceps.  Balanced salt solution was used to hydrodissect the nucleus.  The [employer] phacoemulsification unit was then used to completely phacoemulsify the nucleus, following which an aspirating handpiece was used to aspirate all cortical remnants from the capsular bag.  Viscoelastic was again used to reform the anterior chamber and capsular bag.      A new foldable posterior chamber intraocular lens was brought into the surgical field.  It was folded and directly injected into the capsular bag following which it was centered and secure.  All viscoelastic was then aspirated from the anterior segment using an aspirating handpiece.  All wounds were checked for leaks and there were none.  A solution consisting of 4 mg dexamethasone, 100 mg cefazolin was used to irrigate the surface of the eye.  The lid speculum was removed from the eye and a shield placed over the eye.      The patient tolerated the procedure well and went to the recovery area in good condition.      					       ASSISTANT SURGEON:  Esther Vee MD    DATE OF SURGERY:  9/3/24    ANESTHESIA:  MAC/TOPICAL	    ESTIMATED BLOOD LOSS: < 1mL	    COMPLICATIONS:  none		    PREOPERATIVE DIAGNOSIS:  Cataract, Left eye    POSTOPERATIVE DIAGNOSIS:  Cataract, Left eye    OPERATIVE PROCEDURE:  Phacoemulsification with insertion  Of posterior chamber intraocular lens Left eye    LENS IMPLANT CCAOTO +20.0    PROCEDURE:	The patient was brought into the operating room and placed supine on the operating room table. After uneventful induction of neuroleptic anesthesia, lidocaine gel was instilled into the operative eye achieving sufficient anesthesia.  The patient was then prepped and draped in the usual sterile fashion.  A wire lid speculum was then inserted into the operative eye giving a wide palpebral fissure.      A jovani knife was used to perform paracenteses through clear cornea at the 12 and 6 o’clock limbal positions.  The anterior chamber was irrigated with lidocaine 1% nonpreserved.  Viscoelastic was then used to maintain the anterior chamber.  A keratome was used to create a clear corneal incision just inside the temporal limbus.  A bent 25 gauge needle was then used to initiate an anterior capsulorhexis, which was completed with the use of capsulorhexis forceps.  Balanced salt solution was used to hydrodissect the nucleus.  The [employer] phacoemulsification unit was then used to completely phacoemulsify the nucleus, following which an aspirating handpiece was used to aspirate all cortical remnants from the capsular bag.  Viscoelastic was again used to reform the anterior chamber and capsular bag.      A new foldable posterior chamber intraocular lens was brought into the surgical field.  It was folded and directly injected into the capsular bag following which it was centered and secure.  All viscoelastic was then aspirated from the anterior segment using an aspirating handpiece.  All wounds were checked for leaks and there were none.  A solution consisting of 4 mg dexamethasone, 100 mg cefazolin was used to irrigate the surface of the eye.  The lid speculum was removed from the eye and a shield placed over the eye.      The patient tolerated the procedure well and went to the recovery area in good condition.      Chance PEREYRA MD was present for all aspects of the case.

## 2024-09-03 ENCOUNTER — OUTPATIENT (OUTPATIENT)
Dept: OUTPATIENT SERVICES | Facility: HOSPITAL | Age: 64
LOS: 1 days | Discharge: HOME | End: 2024-09-03
Payer: COMMERCIAL

## 2024-09-03 ENCOUNTER — APPOINTMENT (OUTPATIENT)
Dept: OPHTHALMOLOGY | Facility: AMBULATORY SURGERY CENTER | Age: 64
End: 2024-09-03

## 2024-09-03 ENCOUNTER — TRANSCRIPTION ENCOUNTER (OUTPATIENT)
Age: 64
End: 2024-09-03

## 2024-09-03 VITALS
DIASTOLIC BLOOD PRESSURE: 74 MMHG | SYSTOLIC BLOOD PRESSURE: 122 MMHG | TEMPERATURE: 97 F | HEART RATE: 88 BPM | OXYGEN SATURATION: 97 % | RESPIRATION RATE: 16 BRPM

## 2024-09-03 VITALS
HEIGHT: 59 IN | RESPIRATION RATE: 16 BRPM | TEMPERATURE: 99 F | HEART RATE: 86 BPM | OXYGEN SATURATION: 97 % | DIASTOLIC BLOOD PRESSURE: 81 MMHG | SYSTOLIC BLOOD PRESSURE: 123 MMHG | WEIGHT: 129.19 LBS

## 2024-09-03 DIAGNOSIS — Z98.890 OTHER SPECIFIED POSTPROCEDURAL STATES: Chronic | ICD-10-CM

## 2024-09-03 PROCEDURE — 66984 XCAPSL CTRC RMVL W/O ECP: CPT | Mod: LT

## 2024-09-03 DEVICE — LENS IOL CLAREON CCA0T0 20D
Type: IMPLANTABLE DEVICE | Site: LEFT | Status: NON-FUNCTIONAL
Removed: 2024-09-03

## 2024-09-03 RX ORDER — ACETAMINOPHEN 325 MG/1
650 TABLET ORAL ONCE
Refills: 0 | Status: DISCONTINUED | OUTPATIENT
Start: 2024-09-03 | End: 2024-09-03

## 2024-09-03 RX ORDER — TROPICAMIDE 1 %
1 DROPS OPHTHALMIC (EYE)
Refills: 0 | Status: COMPLETED | OUTPATIENT
Start: 2024-09-03 | End: 2024-09-03

## 2024-09-03 RX ORDER — OFLOXACIN 3 MG/ML
1 SOLUTION/ DROPS OPHTHALMIC
Refills: 0 | Status: COMPLETED | OUTPATIENT
Start: 2024-09-03 | End: 2024-09-03

## 2024-09-03 RX ORDER — ONDANSETRON 2 MG/ML
4 INJECTION, SOLUTION INTRAMUSCULAR; INTRAVENOUS ONCE
Refills: 0 | Status: DISCONTINUED | OUTPATIENT
Start: 2024-09-03 | End: 2024-09-03

## 2024-09-03 RX ORDER — TETRACAINE HCL 0.5 %
1 DROPS OPHTHALMIC (EYE) ONCE
Refills: 0 | Status: COMPLETED | OUTPATIENT
Start: 2024-09-03 | End: 2024-09-03

## 2024-09-03 RX ORDER — PHENYLEPHRINE HYDROCHLORIDE 25 MG/ML
1 SOLUTION/ DROPS OPHTHALMIC
Refills: 0 | Status: COMPLETED | OUTPATIENT
Start: 2024-09-03 | End: 2024-09-03

## 2024-09-03 RX ADMIN — OFLOXACIN 1 DROP(S): 3 SOLUTION/ DROPS OPHTHALMIC at 08:21

## 2024-09-03 RX ADMIN — PHENYLEPHRINE HYDROCHLORIDE 1 DROP(S): 25 SOLUTION/ DROPS OPHTHALMIC at 08:26

## 2024-09-03 RX ADMIN — PHENYLEPHRINE HYDROCHLORIDE 1 DROP(S): 25 SOLUTION/ DROPS OPHTHALMIC at 08:21

## 2024-09-03 RX ADMIN — Medication 1 DROP(S): at 08:21

## 2024-09-03 RX ADMIN — PHENYLEPHRINE HYDROCHLORIDE 1 DROP(S): 25 SOLUTION/ DROPS OPHTHALMIC at 08:31

## 2024-09-03 RX ADMIN — OFLOXACIN 1 DROP(S): 3 SOLUTION/ DROPS OPHTHALMIC at 08:31

## 2024-09-03 RX ADMIN — Medication 1 DROP(S): at 08:31

## 2024-09-03 RX ADMIN — Medication 1 DROP(S): at 08:26

## 2024-09-03 RX ADMIN — OFLOXACIN 1 DROP(S): 3 SOLUTION/ DROPS OPHTHALMIC at 08:26

## 2024-09-03 NOTE — PRE-ANESTHESIA EVALUATION ADULT - NSANTHPEFT_GEN_ALL_CORE
A&Ox3  neuro:  neck:  lung:  cor:  extr: A&Ox3  neuro: nofocal  neck: no JVD, bruit, retraction  lung: CTA  cor: S1 S2 soft systolic flow murmur RRR  extr: no c,c,e

## 2024-09-03 NOTE — ASU PREOP CHECKLIST - HEIGHT IN INCHES
Detail Level: Zone Summarized Lab Results: Both lab reports showed elevated cholesterol levels \\n\\nPatient admits she was not fasting for her first lab draw but was fasting for the second lab draw\\n\\nInstructed patient to follow with pediatrician/pcp for hypercholesterolemia \\nWill hold on starting accutane due to elevated levels \\nPatient will contact us once seen by PCP and if clearance obtained for accutane 11

## 2024-09-04 ENCOUNTER — APPOINTMENT (OUTPATIENT)
Dept: OPHTHALMOLOGY | Facility: CLINIC | Age: 64
End: 2024-09-04
Payer: COMMERCIAL

## 2024-09-04 ENCOUNTER — NON-APPOINTMENT (OUTPATIENT)
Age: 64
End: 2024-09-04

## 2024-09-04 PROCEDURE — 99024 POSTOP FOLLOW-UP VISIT: CPT

## 2024-09-04 NOTE — ASU PATIENT PROFILE, ADULT - PRO ARRIVE FROM
Renetta Waldron was seen and treated in our emergency department on 9/4/2024.  She may return to work on 09/05/2024.       If you have any questions or concerns, please don't hesitate to call.      Yuliana Rodriguez MD
home

## 2024-09-11 NOTE — ASU PATIENT PROFILE, ADULT - NS PREOP UNDERSTANDS INFO
bring photo id , insurance, credit cards. nothing to eat from midnight, clears till 09:30 am dos. no smoking/alcohol/drugs/jewelry/valuables . wear loose comfort clothes. must have an escort. no candy/chewing gum /no milk. address and phone # provided/yes

## 2024-09-11 NOTE — ASU PATIENT PROFILE, ADULT - FALL HARM RISK - HARM RISK INTERVENTIONS

## 2024-09-11 NOTE — ASU PATIENT PROFILE, ADULT - NSICDXPASTMEDICALHX_GEN_ALL_CORE_FT
PAST MEDICAL HISTORY:  Asthma     Breast cancer     Gout     Hyperlipemia     Seasonal allergies

## 2024-09-11 NOTE — ASU PATIENT PROFILE, ADULT - VISION (WITH CORRECTIVE LENSES IF THE PATIENT USUALLY WEARS THEM):
Partially impaired: cannot see medication labels or newsprint, but can see obstacles in path, and the surrounding layout; can count fingers at arm's length Severely impaired: cannot locate objects without hearing or touching them or patient nonresponsive.

## 2024-09-12 ENCOUNTER — OUTPATIENT (OUTPATIENT)
Dept: OUTPATIENT SERVICES | Facility: HOSPITAL | Age: 64
LOS: 1 days | Discharge: HOME | End: 2024-09-12
Payer: COMMERCIAL

## 2024-09-12 ENCOUNTER — TRANSCRIPTION ENCOUNTER (OUTPATIENT)
Age: 64
End: 2024-09-12

## 2024-09-12 ENCOUNTER — APPOINTMENT (OUTPATIENT)
Dept: OPHTHALMOLOGY | Facility: AMBULATORY SURGERY CENTER | Age: 64
End: 2024-09-12

## 2024-09-12 VITALS
HEART RATE: 84 BPM | DIASTOLIC BLOOD PRESSURE: 72 MMHG | OXYGEN SATURATION: 98 % | TEMPERATURE: 98 F | SYSTOLIC BLOOD PRESSURE: 118 MMHG | RESPIRATION RATE: 16 BRPM

## 2024-09-12 VITALS
DIASTOLIC BLOOD PRESSURE: 78 MMHG | WEIGHT: 128.09 LBS | OXYGEN SATURATION: 98 % | TEMPERATURE: 100 F | HEIGHT: 59 IN | HEART RATE: 84 BPM | SYSTOLIC BLOOD PRESSURE: 128 MMHG | RESPIRATION RATE: 16 BRPM

## 2024-09-12 DIAGNOSIS — Z98.890 OTHER SPECIFIED POSTPROCEDURAL STATES: Chronic | ICD-10-CM

## 2024-09-12 PROCEDURE — 66984 XCAPSL CTRC RMVL W/O ECP: CPT | Mod: RT,79

## 2024-09-12 DEVICE — LENS IOL CLAREON CCA0T0 18.5D
Type: IMPLANTABLE DEVICE | Site: RIGHT | Status: NON-FUNCTIONAL
Removed: 2024-09-12

## 2024-09-12 RX ORDER — ONDANSETRON 2 MG/ML
4 INJECTION, SOLUTION INTRAMUSCULAR; INTRAVENOUS ONCE
Refills: 0 | Status: DISCONTINUED | OUTPATIENT
Start: 2024-09-12 | End: 2024-09-12

## 2024-09-12 RX ORDER — TETRACAINE HCL 0.5 %
1 DROPS OPHTHALMIC (EYE) ONCE
Refills: 0 | Status: COMPLETED | OUTPATIENT
Start: 2024-09-12 | End: 2024-09-12

## 2024-09-12 RX ORDER — TROPICAMIDE 1 %
1 DROPS OPHTHALMIC (EYE)
Refills: 0 | Status: COMPLETED | OUTPATIENT
Start: 2024-09-12 | End: 2024-09-12

## 2024-09-12 RX ORDER — FENTANYL CITRATE 50 UG/ML
25 INJECTION INTRAMUSCULAR; INTRAVENOUS
Refills: 0 | Status: DISCONTINUED | OUTPATIENT
Start: 2024-09-12 | End: 2024-09-12

## 2024-09-12 RX ORDER — OFLOXACIN 3 MG/ML
1 SOLUTION/ DROPS OPHTHALMIC
Refills: 0 | Status: COMPLETED | OUTPATIENT
Start: 2024-09-12 | End: 2024-09-12

## 2024-09-12 RX ORDER — TAMOXIFEN CITRATE 10 MG
1 TABLET ORAL
Refills: 0 | DISCHARGE

## 2024-09-12 RX ORDER — PHENYLEPHRINE HYDROCHLORIDE 25 MG/ML
1 SOLUTION/ DROPS OPHTHALMIC
Refills: 0 | Status: COMPLETED | OUTPATIENT
Start: 2024-09-12 | End: 2024-09-12

## 2024-09-12 RX ADMIN — Medication 1 DROP(S): at 12:55

## 2024-09-12 RX ADMIN — OFLOXACIN 1 DROP(S): 3 SOLUTION/ DROPS OPHTHALMIC at 12:55

## 2024-09-12 RX ADMIN — PHENYLEPHRINE HYDROCHLORIDE 1 DROP(S): 25 SOLUTION/ DROPS OPHTHALMIC at 12:55

## 2024-09-12 RX ADMIN — OFLOXACIN 1 DROP(S): 3 SOLUTION/ DROPS OPHTHALMIC at 12:50

## 2024-09-12 RX ADMIN — Medication 1 DROP(S): at 12:50

## 2024-09-12 RX ADMIN — PHENYLEPHRINE HYDROCHLORIDE 1 DROP(S): 25 SOLUTION/ DROPS OPHTHALMIC at 12:50

## 2024-09-12 RX ADMIN — Medication 1 DROP(S): at 12:45

## 2024-09-12 RX ADMIN — PHENYLEPHRINE HYDROCHLORIDE 1 DROP(S): 25 SOLUTION/ DROPS OPHTHALMIC at 12:45

## 2024-09-12 RX ADMIN — OFLOXACIN 1 DROP(S): 3 SOLUTION/ DROPS OPHTHALMIC at 12:45

## 2024-09-12 NOTE — PRE-ANESTHESIA EVALUATION ADULT - NSANTHADDINFOFT_GEN_ALL_CORE
I explained the risks and benefits of the proposed anesthetic plan, patient understands these risks and benefits. All of the patient's questions regarding anesthetic plan were answered.

## 2024-09-12 NOTE — PRE-ANESTHESIA EVALUATION ADULT - NSANTHPEFT_GEN_ALL_CORE
General: NAD  Neuro: A&Ox3  CV: S1 S2, exercise tolerance > 4 METS  Pulm: breathing is bilateral and non-labored on room air

## 2024-09-12 NOTE — OPERATIVE REPORT - OPERATIVE RPOSRT DETAILS
ASSISTANT SURGEON:  Esther Vee MD    DATE OF SURGERY:  9/12/24    ANESTHESIA:  MAC/TOPICAL	    ESTIMATED BLOOD LOSS: < 1mL	    COMPLICATIONS:  none		    PREOPERATIVE DIAGNOSIS:  Cataract, Right eye    POSTOPERATIVE DIAGNOSIS:  Cataract, Right eye    OPERATIVE PROCEDURE:  Phacoemulsification with insertion  Of posterior chamber intraocular lens Right  eye    LENS IMPLANT CCAOTO + 18.5    PROCEDURE:	The patient was brought into the operating room and placed supine on the operating room table. After uneventful induction of neuroleptic anesthesia, lidocaine gel was instilled into the operative eye achieving sufficient anesthesia.  The patient was then prepped and draped in the usual sterile fashion.  A wire lid speculum was then inserted into the operative eye giving a wide palpebral fissure.      A jovani knife was used to perform paracenteses through clear cornea at the 12 and 6 o’clock limbal positions.  The anterior chamber was irrigated with lidocaine 1% nonpreserved.  Viscoelastic was then used to maintain the anterior chamber.  A keratome was used to create a clear corneal incision just inside the temporal limbus.  A bent 25 gauge needle was then used to initiate an anterior capsulorhexis, which was completed with the use of capsulorhexis forceps.  Balanced salt solution was used to hydrodissect the nucleus.  The [employer] phacoemulsification unit was then used to completely phacoemulsify the nucleus, following which an aspirating handpiece was used to aspirate all cortical remnants from the capsular bag.  Viscoelastic was again used to reform the anterior chamber and capsular bag.      A new foldable posterior chamber intraocular lens was brought into the surgical field.  It was folded and directly injected into the capsular bag following which it was centered and secure.  All viscoelastic was then aspirated from the anterior segment using an aspirating handpiece.  All wounds were checked for leaks and there were none.  A solution consisting of 4 mg dexamethasone, 100 mg cefazolin was used to irrigate the surface of the eye.  The lid speculum was removed from the eye and a shield placed over the eye.      The patient tolerated the procedure well and went to the recovery area in good condition.        Chance PEREYRA MD was present for all aspects of the case.						       ASSISTANT SURGEON:  Esther Vee MD    DATE OF SURGERY:  9/12/24    ANESTHESIA:  MAC/TOPICAL	    ESTIMATED BLOOD LOSS: < 1mL	    COMPLICATIONS:  none		    PREOPERATIVE DIAGNOSIS:  Cataract, Right eye    POSTOPERATIVE DIAGNOSIS:  Cataract, Right eye    OPERATIVE PROCEDURE:  Phacoemulsification with insertion  Of posterior chamber intraocular lens Right  eye    LENS IMPLANT CCAOTO + 18.5    PROCEDURE:	    The patient was brought into the operating room and placed supine on the operating room table. After uneventful induction of neuroleptic anesthesia, lidocaine gel was instilled into the operative eye achieving sufficient anesthesia.  The patient was then prepped and draped in the usual sterile fashion.  A wire lid speculum was then inserted into the operative eye giving a wide palpebral fissure.      A jovani knife was used to perform paracenteses through clear cornea at the 12 and 6 o’clock limbal positions.  The anterior chamber was irrigated with lidocaine 1% nonpreserved.  Viscoelastic was then used to maintain the anterior chamber.  A keratome was used to create a clear corneal incision just inside the temporal limbus.  A bent 25 gauge needle was then used to initiate an anterior capsulorhexis, which was completed with the use of capsulorhexis forceps.  Balanced salt solution was used to hydrodissect the nucleus.  The [employer] phacoemulsification unit was then used to completely phacoemulsify the nucleus, following which an aspirating handpiece was used to aspirate all cortical remnants from the capsular bag.  Viscoelastic was again used to reform the anterior chamber and capsular bag.      A new foldable posterior chamber intraocular lens was brought into the surgical field.  It was folded and directly injected into the capsular bag following which it was centered and secure.  All viscoelastic was then aspirated from the anterior segment using an aspirating handpiece.  All wounds were checked for leaks and there were none.  A solution consisting of 4 mg dexamethasone, 100 mg cefazolin was used to irrigate the surface of the eye.  The lid speculum was removed from the eye and a shield placed over the eye.      The patient tolerated the procedure well and went to the recovery area in good condition.        Chance PEREYRA MD was present for all aspects of the case.						       ASSISTANT SURGEON:  Esther Vee MD    DATE OF SURGERY:  9/12/24    ANESTHESIA:  MAC/TOPICAL	    ESTIMATED BLOOD LOSS: < 1mL	    COMPLICATIONS:  none		    PREOPERATIVE DIAGNOSIS:  Cataract, Right eye    POSTOPERATIVE DIAGNOSIS:  Cataract, Right eye.    OPERATIVE PROCEDURE:  Phacoemulsification with insertion  Of posterior chamber intraocular lens Right  eye    LENS IMPLANT CCAOTO + 18.5    PROCEDURE:	    The patient was brought into the operating room and placed supine on the operating room table. After uneventful induction of neuroleptic anesthesia, lidocaine gel was instilled into the operative eye achieving sufficient anesthesia.  The patient was then prepped and draped in the usual sterile fashion.  A wire lid speculum was then inserted into the operative eye giving a wide palpebral fissure.      A jovani knife was used to perform paracenteses through clear cornea at the 12 and 6 o’clock limbal positions.  The anterior chamber was irrigated with lidocaine 1% nonpreserved.  Viscoelastic was then used to maintain the anterior chamber.  A keratome was used to create a clear corneal incision just inside the temporal limbus.  A bent 25 gauge needle was then used to initiate an anterior capsulorhexis, which was completed with the use of capsulorhexis forceps.  Balanced salt solution was used to hydrodissect the nucleus.  The [employer] phacoemulsification unit was then used to completely phacoemulsify the nucleus, following which an aspirating handpiece was used to aspirate all cortical remnants from the capsular bag.  Viscoelastic was again used to reform the anterior chamber and capsular bag.      A new foldable posterior chamber intraocular lens was brought into the surgical field.  It was folded and directly injected into the capsular bag following which it was centered and secure.  All viscoelastic was then aspirated from the anterior segment using an aspirating handpiece.  All wounds were checked for leaks and there were none.  A solution consisting of 4 mg dexamethasone, 100 mg cefazolin was used to irrigate the surface of the eye.  The lid speculum was removed from the eye and a shield placed over the eye.      The patient tolerated the procedure well and went to the recovery area in good condition.        Chance PEREYRA MD was present for all aspects of the case.

## 2024-09-12 NOTE — ASU PREOP CHECKLIST - TEMPERATURE IN FAHRENHEIT (DEGREES F)
99.7
86F to ED s/p trip and fall on carpet today. Patient hit head and has lac above R orbit, patient denies LOC, patient is on Eliquis and has history of factor 7 deficiency. Patient has hx of pacemaker placement at this hospital. Patient is alert and oriented x4, cooperative, NAD, VSS. Patient is given call bell and verbalizes understanding of how to use it at this time.

## 2024-09-13 ENCOUNTER — NON-APPOINTMENT (OUTPATIENT)
Age: 64
End: 2024-09-13

## 2024-09-13 ENCOUNTER — APPOINTMENT (OUTPATIENT)
Dept: OPHTHALMOLOGY | Facility: CLINIC | Age: 64
End: 2024-09-13
Payer: COMMERCIAL

## 2024-09-13 PROCEDURE — 99024 POSTOP FOLLOW-UP VISIT: CPT

## 2024-09-19 ENCOUNTER — NON-APPOINTMENT (OUTPATIENT)
Age: 64
End: 2024-09-19

## 2024-09-19 ENCOUNTER — APPOINTMENT (OUTPATIENT)
Dept: OPHTHALMOLOGY | Facility: CLINIC | Age: 64
End: 2024-09-19
Payer: COMMERCIAL

## 2024-09-19 PROCEDURE — 99024 POSTOP FOLLOW-UP VISIT: CPT

## 2024-09-30 ENCOUNTER — APPOINTMENT (OUTPATIENT)
Dept: OPHTHALMOLOGY | Facility: CLINIC | Age: 64
End: 2024-09-30
Payer: COMMERCIAL

## 2024-09-30 ENCOUNTER — NON-APPOINTMENT (OUTPATIENT)
Age: 64
End: 2024-09-30

## 2024-09-30 PROCEDURE — 99024 POSTOP FOLLOW-UP VISIT: CPT

## 2024-10-14 PROBLEM — M10.9 GOUT, UNSPECIFIED: Chronic | Status: ACTIVE | Noted: 2024-09-03

## 2024-10-14 PROBLEM — E78.5 HYPERLIPIDEMIA, UNSPECIFIED: Chronic | Status: ACTIVE | Noted: 2024-08-30

## 2024-10-29 NOTE — ASU PREOP CHECKLIST - BP NONINVASIVE SYSTOLIC (MM HG)
Continued Stay SW/CM Assessment/Plan of Care Note       Active Substitute Decision Maker (SDM)    There are no active Substitute Decision Maker (SDM) on file.       Progress note:  Per [facility] DME liaison, pt's insurance requires prior auth for WC, which can take up to 21 days. WC can be delivered today if pt agrees to sign waiver that states if auth is denied they will be responsible for the $128.15 per month rental. CM spoke with pt at bedside regarding waiver. Pt agreeable to sign waiver and pay rental fee if denied. WC and slide board order sent to [facility] for delivery this afternoon. Pt reports that her mother will be coming at 1015 today for family teaching with PT. CM to continue to follow for DC planning and DME delivery.     Addendum 1115: CM entered patient's room. Patient's family present with PT and informed CM that patient will be leaving tomorrow because the ramp has not yet been delivered. Ramp is expected to be delivered tomorrow. RN and MD updated via SC. WC and slide board delivered to patient today. [facility] PT and OT orders in. Patient will call to schedule van ride for tomorrow once done working with PT. CM to continue to follow.      Addendum 1505: CM spoke with pt at bedside. Delivery date for ramp was changed to Thursday (10/31), and patient was unable to contact [employer] for assistance. Her friend's  is able to go to her home tomorrow after 1600 and place boards for  to go over 2 steps to enter home. He will also be available to assist with entrance. Patient also reports she is reaching out to her son who has a lower to the ground car to see if he can provide transportation. CM encouraged patient to contact  van, [employer], to set up transport for tomorrow as a back up. Patient has contact information and is agreeable.     See SW/CM flowsheets for other objective data.    Disposition Recommendations:  Preliminary discharge destination:    SW/CM recommendation for discharge: Home,  Home therapy    Destination Pharmacy:        Discharge Plan/Needs:     Continued Care and Services - Admitted Since 10/19/2024       Destination        Service Provider Request Status Selected Services Address Phone Fax    [facility] - IP Declined  Patient does not meet the level of care required for admission -- [address] [facility] [address] [phone]                      Devices/ Equipment that need to be arranged for discharge:   WC and slide board   Accepted   Pending insurance authorization   Others:    Anticipated date of DME availability: 10/29 afternoon    Prior To Hospitalization:    Living Situation: Adult children and residing at House    .  Support Systems: Family members, Children   Home Devices/Equipment: None            Mobility Assist Devices: None   Type of Service Prior to Hospitalization: None               Patient/Family discharge goal (s):  Home therapy, Home Care     Resources provided:           Prior Function  Bed Mobility: Independent (10/21/24 0905 : Carolyne Vega, PT)  Transfers: Independent (10/21/24 0905 : Carolyne Vega, PT)  Ambulation in the Home: Independent (10/21/24 0905 : Carolyne Vega, PT)  Ambulation in the Community: Independent (10/21/24 0905 : Carolyne Vega, PT)    Current Function  Last Filed Values         Value Time User    Current Function  significantly below baseline level of function 10/28/2024  4:33 PM Carolyne Vega PT    Therapy Impairments  activity tolerance; balance; pain; strength; safety awareness; ROM; coordination 10/28/2024  4:33 PM Carolyne Vega PT    ADLs Requiring Support  bed mobility; transfers; ambulation; stairs 10/28/2024  4:33 PM Carolyne Vega PT            Therapy Recommendations for Discharge:   PT:      Last Filed Values         Value Time User    PT Discharge Needs  therapy 1-3 times per week 10/28/2024  4:33 PM Carolyne Vega PT          OT:        Last Filed Values         Value Time User    OT Discharge Needs  intensive daily therapy 10/27/2024  2:27 PM Rosalind Welsh, OT          SLP:    Last Filed Values       None            Mobility Equipment Recommended for Discharge: needs WC and slideboard.      Barriers to Discharge  Identified Barriers to Discharge/Transition Planning:                     126

## 2024-12-26 ENCOUNTER — APPOINTMENT (OUTPATIENT)
Dept: INTERNAL MEDICINE | Facility: CLINIC | Age: 64
End: 2024-12-26
Payer: COMMERCIAL

## 2024-12-26 ENCOUNTER — NON-APPOINTMENT (OUTPATIENT)
Age: 64
End: 2024-12-26

## 2024-12-26 DIAGNOSIS — R68.89 OTHER GENERAL SYMPTOMS AND SIGNS: ICD-10-CM

## 2024-12-26 PROCEDURE — 99212 OFFICE O/P EST SF 10 MIN: CPT

## 2024-12-26 PROCEDURE — G2211 COMPLEX E/M VISIT ADD ON: CPT

## 2024-12-30 ENCOUNTER — TRANSCRIPTION ENCOUNTER (OUTPATIENT)
Age: 64
End: 2024-12-30

## 2024-12-30 ENCOUNTER — RX RENEWAL (OUTPATIENT)
Age: 64
End: 2024-12-30

## 2024-12-30 DIAGNOSIS — U07.1 COVID-19: ICD-10-CM

## 2024-12-30 RX ORDER — NIRMATRELVIR AND RITONAVIR 300-100 MG
20 X 150 MG & KIT ORAL TWICE DAILY
Qty: 30 | Refills: 0 | Status: ACTIVE | COMMUNITY
Start: 2024-12-30 | End: 1900-01-01

## 2024-12-31 ENCOUNTER — TRANSCRIPTION ENCOUNTER (OUTPATIENT)
Age: 64
End: 2024-12-31

## 2025-01-02 ENCOUNTER — TRANSCRIPTION ENCOUNTER (OUTPATIENT)
Age: 65
End: 2025-01-02

## 2025-04-16 ENCOUNTER — APPOINTMENT (OUTPATIENT)
Dept: HEMATOLOGY ONCOLOGY | Facility: CLINIC | Age: 65
End: 2025-04-16
Payer: COMMERCIAL

## 2025-04-16 VITALS
TEMPERATURE: 98.6 F | WEIGHT: 128 LBS | HEIGHT: 59 IN | RESPIRATION RATE: 18 BRPM | OXYGEN SATURATION: 95 % | BODY MASS INDEX: 25.8 KG/M2 | SYSTOLIC BLOOD PRESSURE: 146 MMHG | HEART RATE: 101 BPM | DIASTOLIC BLOOD PRESSURE: 86 MMHG

## 2025-04-16 DIAGNOSIS — E66.3 OVERWEIGHT: ICD-10-CM

## 2025-04-16 DIAGNOSIS — C50.912 MALIGNANT NEOPLASM OF UNSPECIFIED SITE OF LEFT FEMALE BREAST: ICD-10-CM

## 2025-04-16 DIAGNOSIS — M10.9 GOUT, UNSPECIFIED: ICD-10-CM

## 2025-04-16 DIAGNOSIS — Z17.0 MALIGNANT NEOPLASM OF UNSPECIFIED SITE OF LEFT FEMALE BREAST: ICD-10-CM

## 2025-04-16 DIAGNOSIS — Z92.29 PERSONAL HISTORY OF OTHER DRUG THERAPY: ICD-10-CM

## 2025-04-16 LAB
ALBUMIN SERPL ELPH-MCNC: 4 G/DL
ALP BLD-CCNC: 52 U/L
ALT SERPL-CCNC: 20 U/L
ANION GAP SERPL CALC-SCNC: 1 MMOL/L
AST SERPL-CCNC: 29 U/L
BILIRUB SERPL-MCNC: 0.7 MG/DL
BUN SERPL-MCNC: 13 MG/DL
CALCIUM SERPL-MCNC: 10 MG/DL
CHLORIDE SERPL-SCNC: 112 MMOL/L
CO2 SERPL-SCNC: 28 MMOL/L
CREAT SERPL-MCNC: 0.7 MG/DL
EGFRCR SERPLBLD CKD-EPI 2021: 97 ML/MIN/1.73M2
GLUCOSE SERPL-MCNC: 94 MG/DL
HCT VFR BLD CALC: 41.4 %
HGB BLD-MCNC: 14 G/DL
LYMPHOCYTES # BLD AUTO: 2.2 K/UL
LYMPHOCYTES NFR BLD AUTO: 35.5 %
MAN DIFF?: NO
MCHC RBC-ENTMCNC: 30.7 PG
MCHC RBC-ENTMCNC: 33.8 G/DL
MCV RBC AUTO: 90.8 FL
NEUTROPHILS # BLD AUTO: 3.4 K/UL
NEUTROPHILS NFR BLD AUTO: 55.9 %
PLATELET # BLD AUTO: 228 K/UL
POTASSIUM SERPL-SCNC: 4.7 MMOL/L
PROT SERPL-MCNC: 7.5 G/DL
RBC # BLD: 4.56 M/UL
RBC # FLD: 11.8 %
SODIUM SERPL-SCNC: 141 MMOL/L
WBC # FLD AUTO: 6.1 K/UL

## 2025-04-16 PROCEDURE — 36415 COLL VENOUS BLD VENIPUNCTURE: CPT

## 2025-04-16 PROCEDURE — 99215 OFFICE O/P EST HI 40 MIN: CPT | Mod: 25

## 2025-04-16 RX ORDER — TAMOXIFEN CITRATE 20 MG/1
20 TABLET, FILM COATED ORAL DAILY
Qty: 90 | Refills: 3 | Status: ACTIVE | COMMUNITY
Start: 2025-04-16 | End: 1900-01-01

## 2025-04-17 LAB — 25(OH)D3 SERPL-MCNC: 30.8 NG/ML

## 2025-04-21 ENCOUNTER — RESULT REVIEW (OUTPATIENT)
Age: 65
End: 2025-04-21

## 2025-04-21 ENCOUNTER — APPOINTMENT (OUTPATIENT)
Dept: ULTRASOUND IMAGING | Facility: HOSPITAL | Age: 65
End: 2025-04-21
Payer: COMMERCIAL

## 2025-04-21 ENCOUNTER — OUTPATIENT (OUTPATIENT)
Dept: OUTPATIENT SERVICES | Facility: HOSPITAL | Age: 65
LOS: 1 days | End: 2025-04-21

## 2025-04-21 DIAGNOSIS — Z98.890 OTHER SPECIFIED POSTPROCEDURAL STATES: Chronic | ICD-10-CM

## 2025-04-21 PROCEDURE — 76830 TRANSVAGINAL US NON-OB: CPT | Mod: 26

## 2025-04-21 PROCEDURE — 76856 US EXAM PELVIC COMPLETE: CPT | Mod: 26

## 2025-04-21 PROCEDURE — 76856 US EXAM PELVIC COMPLETE: CPT

## 2025-04-21 PROCEDURE — 76830 TRANSVAGINAL US NON-OB: CPT

## 2025-04-23 ENCOUNTER — NON-APPOINTMENT (OUTPATIENT)
Age: 65
End: 2025-04-23

## 2025-05-30 ENCOUNTER — NON-APPOINTMENT (OUTPATIENT)
Age: 65
End: 2025-05-30

## 2025-05-30 ENCOUNTER — APPOINTMENT (OUTPATIENT)
Dept: GYNECOLOGIC ONCOLOGY | Facility: CLINIC | Age: 65
End: 2025-05-30
Payer: COMMERCIAL

## 2025-05-30 VITALS
SYSTOLIC BLOOD PRESSURE: 124 MMHG | TEMPERATURE: 97.2 F | DIASTOLIC BLOOD PRESSURE: 82 MMHG | HEART RATE: 117 BPM | HEIGHT: 59 IN | BODY MASS INDEX: 25.8 KG/M2 | OXYGEN SATURATION: 95 % | WEIGHT: 128 LBS

## 2025-05-30 DIAGNOSIS — R93.89 ABNORMAL FINDINGS ON DIAGNOSTIC IMAGING OF OTHER SPECIFIED BODY STRUCTURES: ICD-10-CM

## 2025-05-30 PROCEDURE — 99205 OFFICE O/P NEW HI 60 MIN: CPT

## 2025-05-30 RX ORDER — MISOPROSTOL 200 UG/1
200 TABLET ORAL
Qty: 4 | Refills: 0 | Status: ACTIVE | COMMUNITY
Start: 2025-05-30 | End: 1900-01-01

## 2025-06-03 ENCOUNTER — NON-APPOINTMENT (OUTPATIENT)
Age: 65
End: 2025-06-03

## 2025-06-03 ENCOUNTER — APPOINTMENT (OUTPATIENT)
Dept: HEART AND VASCULAR | Facility: CLINIC | Age: 65
End: 2025-06-03
Payer: COMMERCIAL

## 2025-06-03 VITALS
BODY MASS INDEX: 26.66 KG/M2 | WEIGHT: 132 LBS | OXYGEN SATURATION: 99 % | DIASTOLIC BLOOD PRESSURE: 81 MMHG | SYSTOLIC BLOOD PRESSURE: 128 MMHG | HEART RATE: 82 BPM

## 2025-06-03 DIAGNOSIS — E66.3 OVERWEIGHT: ICD-10-CM

## 2025-06-03 DIAGNOSIS — E78.5 HYPERLIPIDEMIA, UNSPECIFIED: ICD-10-CM

## 2025-06-03 DIAGNOSIS — R73.03 PREDIABETES.: ICD-10-CM

## 2025-06-03 DIAGNOSIS — Z01.818 ENCOUNTER FOR OTHER PREPROCEDURAL EXAMINATION: ICD-10-CM

## 2025-06-03 DIAGNOSIS — R07.9 CHEST PAIN, UNSPECIFIED: ICD-10-CM

## 2025-06-03 DIAGNOSIS — Z01.810 ENCOUNTER FOR PREPROCEDURAL CARDIOVASCULAR EXAMINATION: ICD-10-CM

## 2025-06-03 PROCEDURE — 99215 OFFICE O/P EST HI 40 MIN: CPT

## 2025-06-03 PROCEDURE — 93000 ELECTROCARDIOGRAM COMPLETE: CPT

## 2025-06-11 LAB
APTT BLD: 31.3 SEC
INR PPP: 0.82 RATIO
PT BLD: 9.8 SEC

## 2025-06-12 NOTE — ASU PATIENT PROFILE, ADULT - NSICDXPASTMEDICALHX_GEN_ALL_CORE_FT
PAST MEDICAL HISTORY:  Asthma     Breast cancer     Gout     Hyperlipemia     Seasonal allergies      PAST MEDICAL HISTORY:  Asthma     Breast cancer     CAD (coronary artery disease)     Gout     Hyperlipemia     Seasonal allergies

## 2025-06-12 NOTE — ASU PATIENT PROFILE, ADULT - NS PREOP UNDERSTANDS INFO
NPO/NO solid foods after 12MN  water, apple juice till 5-6 am,   dress  comfortable, no lotions, no jewelry, Bring ID photo  and insurance cards, escort arranged, address   and telephone given/yes

## 2025-06-12 NOTE — ASU PATIENT PROFILE, ADULT - FALL HARM RISK - TYPE OF ASSESSMENT
Its ok. We monitor her potassium for that and it has always been normal. She is due for an appt however---bring her in thanks    Admission

## 2025-06-13 ENCOUNTER — RESULT REVIEW (OUTPATIENT)
Age: 65
End: 2025-06-13

## 2025-06-13 ENCOUNTER — OUTPATIENT (OUTPATIENT)
Dept: OUTPATIENT SERVICES | Facility: HOSPITAL | Age: 65
LOS: 1 days | Discharge: HOME | End: 2025-06-13
Payer: COMMERCIAL

## 2025-06-13 ENCOUNTER — TRANSCRIPTION ENCOUNTER (OUTPATIENT)
Age: 65
End: 2025-06-13

## 2025-06-13 ENCOUNTER — APPOINTMENT (OUTPATIENT)
Dept: GYNECOLOGIC ONCOLOGY | Facility: AMBULATORY SURGERY CENTER | Age: 65
End: 2025-06-13

## 2025-06-13 VITALS
HEART RATE: 73 BPM | OXYGEN SATURATION: 99 % | RESPIRATION RATE: 16 BRPM | HEIGHT: 59 IN | SYSTOLIC BLOOD PRESSURE: 147 MMHG | DIASTOLIC BLOOD PRESSURE: 89 MMHG | WEIGHT: 131.4 LBS | TEMPERATURE: 98 F

## 2025-06-13 VITALS
RESPIRATION RATE: 18 BRPM | DIASTOLIC BLOOD PRESSURE: 75 MMHG | OXYGEN SATURATION: 99 % | HEART RATE: 75 BPM | SYSTOLIC BLOOD PRESSURE: 122 MMHG | TEMPERATURE: 98 F

## 2025-06-13 DIAGNOSIS — Z98.890 OTHER SPECIFIED POSTPROCEDURAL STATES: Chronic | ICD-10-CM

## 2025-06-13 PROCEDURE — 58558 HYSTEROSCOPY BIOPSY: CPT

## 2025-06-13 PROCEDURE — 88305 TISSUE EXAM BY PATHOLOGIST: CPT | Mod: 26

## 2025-06-13 DEVICE — AVETA FLEX RESECTING DEVICE 2.9MM: Type: IMPLANTABLE DEVICE | Status: FUNCTIONAL

## 2025-06-13 RX ORDER — FENTANYL CITRATE-0.9 % NACL/PF 100MCG/2ML
25 SYRINGE (ML) INTRAVENOUS
Refills: 0 | Status: DISCONTINUED | OUTPATIENT
Start: 2025-06-13 | End: 2025-06-13

## 2025-06-13 RX ORDER — OXYCODONE HYDROCHLORIDE 30 MG/1
5 TABLET ORAL ONCE
Refills: 0 | Status: DISCONTINUED | OUTPATIENT
Start: 2025-06-13 | End: 2025-06-13

## 2025-06-13 RX ORDER — ACETAMINOPHEN 500 MG/5ML
1000 LIQUID (ML) ORAL ONCE
Refills: 0 | Status: COMPLETED | OUTPATIENT
Start: 2025-06-13 | End: 2025-06-13

## 2025-06-13 RX ORDER — ONDANSETRON HCL/PF 4 MG/2 ML
4 VIAL (ML) INJECTION ONCE
Refills: 0 | Status: DISCONTINUED | OUTPATIENT
Start: 2025-06-13 | End: 2025-06-13

## 2025-06-13 RX ADMIN — Medication 1000 MILLIGRAM(S): at 11:09

## 2025-06-13 NOTE — ASU DISCHARGE PLAN (ADULT/PEDIATRIC) - FINANCIAL ASSISTANCE
[facility] provides services at a reduced cost to those who are determined to be eligible through [facility]’s financial assistance program. Information regarding [facility]’s financial assistance program can be found by going to [email or web address] or by calling [phone].

## 2025-06-13 NOTE — ASU DISCHARGE PLAN (ADULT/PEDIATRIC) - CARE PROVIDER_API CALL
Beverly Graham  Gynecologic Oncology  [address]  [place] [address]  Phone: [phone]  Fax: [phone]  Follow Up Time: 1 month

## 2025-06-13 NOTE — BRIEF OPERATIVE NOTE - OPERATION/FINDINGS
The patient was taken to the operating room, where she received general anesthesia. The patient was placed in the dorsal lithotomy position and examination found the uterus to be 10 weeks in size. The vagina was sterilely prepped and draped. With this done a sterile speculum was placed into the vagina and the cervix was visualised. The anterior lip of the cervix was grasped with a tenaculum. The cervical os was dilated with serial Doe dilators until the hysteroscope was introduced easily into the cervical canal and into the uterus. The tubal ostia were visualised bilaterally. A polypoid lesion was noted to be arising from the fundus. The aveta device was used to resect the lesion. The hysteroscopic evaluation was completed at this time and the hysteroscope removed. 390ml fluid deficit noted. Following this sharp curettage was done and ample endometrial curetting obtained. The tenaculum was removed from the cervical os. No active bleeding was noted from the anterior lip of the cervix or the cervical os on completion of the procedure. The vagina was cleaned using sterile gauze on ring forceps.   All instruments and sponges were accounted for. The patient was taken out of dorsal lithotomy position and awakened from anesthesia. She was taken to the recovery room in a stable condition.

## 2025-06-13 NOTE — BRIEF OPERATIVE NOTE - NSICDXBRIEFPROCEDURE_GEN_ALL_CORE_FT
PROCEDURES:  Hysteroscopy, with dilation and curettage of uterus 13-Jun-2025 10:14:28  Brad Nathan

## 2025-06-17 ENCOUNTER — NON-APPOINTMENT (OUTPATIENT)
Age: 65
End: 2025-06-17

## 2025-07-11 ENCOUNTER — OUTPATIENT (OUTPATIENT)
Dept: OUTPATIENT SERVICES | Facility: HOSPITAL | Age: 65
LOS: 1 days | End: 2025-07-11
Payer: COMMERCIAL

## 2025-07-11 ENCOUNTER — NON-APPOINTMENT (OUTPATIENT)
Age: 65
End: 2025-07-11

## 2025-07-11 ENCOUNTER — RESULT REVIEW (OUTPATIENT)
Age: 65
End: 2025-07-11

## 2025-07-11 DIAGNOSIS — Z98.890 OTHER SPECIFIED POSTPROCEDURAL STATES: Chronic | ICD-10-CM

## 2025-07-11 DIAGNOSIS — R07.9 CHEST PAIN, UNSPECIFIED: ICD-10-CM

## 2025-07-11 PROBLEM — I25.10 ATHEROSCLEROTIC HEART DISEASE OF NATIVE CORONARY ARTERY WITHOUT ANGINA PECTORIS: Chronic | Status: ACTIVE | Noted: 2025-06-13

## 2025-07-11 PROCEDURE — 93016 CV STRESS TEST SUPVJ ONLY: CPT

## 2025-07-11 PROCEDURE — 93018 CV STRESS TEST I&R ONLY: CPT

## 2025-07-11 PROCEDURE — 93306 TTE W/DOPPLER COMPLETE: CPT | Mod: 26

## 2025-07-11 PROCEDURE — 93356 MYOCRD STRAIN IMG SPCKL TRCK: CPT

## 2025-08-04 ENCOUNTER — APPOINTMENT (OUTPATIENT)
Dept: INTERNAL MEDICINE | Facility: CLINIC | Age: 65
End: 2025-08-04
Payer: COMMERCIAL

## 2025-08-04 VITALS
WEIGHT: 132 LBS | OXYGEN SATURATION: 95 % | BODY MASS INDEX: 26.66 KG/M2 | DIASTOLIC BLOOD PRESSURE: 82 MMHG | SYSTOLIC BLOOD PRESSURE: 136 MMHG | HEART RATE: 110 BPM

## 2025-08-04 DIAGNOSIS — J45.909 UNSPECIFIED ASTHMA, UNCOMPLICATED: ICD-10-CM

## 2025-08-04 DIAGNOSIS — M79.673 PAIN IN UNSPECIFIED FOOT: ICD-10-CM

## 2025-08-04 DIAGNOSIS — J32.9 CHRONIC SINUSITIS, UNSPECIFIED: ICD-10-CM

## 2025-08-04 DIAGNOSIS — R05.9 COUGH, UNSPECIFIED: ICD-10-CM

## 2025-08-04 PROCEDURE — G2211 COMPLEX E/M VISIT ADD ON: CPT

## 2025-08-04 PROCEDURE — 99214 OFFICE O/P EST MOD 30 MIN: CPT

## 2025-08-04 RX ORDER — FEXOFENADINE HCL 180 MG/1
180 TABLET, FILM COATED ORAL
Qty: 30 | Refills: 0 | Status: ACTIVE | COMMUNITY
Start: 2025-08-04 | End: 1900-01-01

## 2025-08-04 RX ORDER — BUDESONIDE AND FORMOTEROL FUMARATE DIHYDRATE 80; 4.5 UG/1; UG/1
80-4.5 AEROSOL RESPIRATORY (INHALATION)
Qty: 1 | Refills: 1 | Status: ACTIVE | COMMUNITY
Start: 2025-08-04 | End: 1900-01-01

## 2025-08-04 RX ORDER — MONTELUKAST 10 MG/1
10 TABLET, FILM COATED ORAL DAILY
Qty: 90 | Refills: 1 | Status: ACTIVE | COMMUNITY
Start: 2025-08-04 | End: 1900-01-01

## 2025-08-04 RX ORDER — DICLOFENAC SODIUM 10 MG/G
1 GEL TOPICAL
Qty: 1 | Refills: 2 | Status: ACTIVE | COMMUNITY
Start: 2025-08-04 | End: 1900-01-01

## 2025-08-12 ENCOUNTER — RX RENEWAL (OUTPATIENT)
Age: 65
End: 2025-08-12

## 2025-09-16 ENCOUNTER — APPOINTMENT (OUTPATIENT)
Dept: OTOLARYNGOLOGY | Facility: CLINIC | Age: 65
End: 2025-09-16
Payer: COMMERCIAL

## 2025-09-16 ENCOUNTER — APPOINTMENT (OUTPATIENT)
Dept: OTOLARYNGOLOGY | Facility: CLINIC | Age: 65
End: 2025-09-16

## 2025-09-16 VITALS
WEIGHT: 131 LBS | SYSTOLIC BLOOD PRESSURE: 143 MMHG | HEART RATE: 77 BPM | TEMPERATURE: 96.8 F | BODY MASS INDEX: 26.41 KG/M2 | HEIGHT: 59 IN | DIASTOLIC BLOOD PRESSURE: 85 MMHG

## 2025-09-16 DIAGNOSIS — H93.19 TINNITUS, UNSPECIFIED EAR: ICD-10-CM

## 2025-09-16 PROCEDURE — 99204 OFFICE O/P NEW MOD 45 MIN: CPT

## (undated) DEVICE — CANNULA IRR ANT CHAMBER 30G

## (undated) DEVICE — SOL IRR BAL SALT 500ML

## (undated) DEVICE — CANNULA ALCON CONSTELLATION INFUSION 23G WITH PRIMING TRAY

## (undated) DEVICE — ENDOGLIDE ULTRATHIN

## (undated) DEVICE — GLV 6 PROTEXIS (WHITE)

## (undated) DEVICE — WARMING BLANKET LOWER ADULT

## (undated) DEVICE — DRSG PAD SANITARY OB

## (undated) DEVICE — GLV 8 PROTEXIS (WHITE)

## (undated) DEVICE — NDL RETROBULBAR VISITEC 25X1.5

## (undated) DEVICE — SOL INJ NS 0.9% 1000ML

## (undated) DEVICE — CANNULA ANT CHMBR 27GX22MM

## (undated) DEVICE — ACCESSORY AVETA WASTE MANAGEMENT 3MM

## (undated) DEVICE — AVETA CORAL HYSTEROSCOPE 4.6MM DISP

## (undated) DEVICE — PACK ANTERIOR SEGMENT

## (undated) DEVICE — VENODYNE/SCD SLEEVE CALF MEDIUM

## (undated) DEVICE — POSITIONER FOAM EGG CRATE ULNAR 2PCS (PINK)

## (undated) DEVICE — SPECIMEN CONTAINER 100ML

## (undated) DEVICE — KIT CENTURION ANTERIOR

## (undated) DEVICE — PRESSURE INFUSOR BAG 1000ML

## (undated) DEVICE — TUBING SET GRAVITY 2 SPIKE

## (undated) DEVICE — DRAPE MICROSCOPE KNOB COVER SMALL (2 PCS)

## (undated) DEVICE — GLV 6.5 PROTEXIS (WHITE)

## (undated) DEVICE — SUT NYLON 10-0 12" CU-5

## (undated) DEVICE — PACK CENTURION 2.4MM

## (undated) DEVICE — GOWN XXL

## (undated) DEVICE — AVETA FLUID MANAGEMENT ACCESSORY

## (undated) DEVICE — PACK D&C

## (undated) DEVICE — POSITIONER STRAP ARMBOARD 1.5X32" DISP

## (undated) DEVICE — WARMING BLANKET UPPER ADULT

## (undated) DEVICE — GLV 7.5 PROTEXIS (WHITE)